# Patient Record
Sex: FEMALE | Race: WHITE | NOT HISPANIC OR LATINO | Employment: FULL TIME | URBAN - METROPOLITAN AREA
[De-identification: names, ages, dates, MRNs, and addresses within clinical notes are randomized per-mention and may not be internally consistent; named-entity substitution may affect disease eponyms.]

---

## 2018-03-01 ENCOUNTER — DOCUMENTATION (OUTPATIENT)
Dept: BARIATRICS/WEIGHT MGMT | Facility: CLINIC | Age: 43
End: 2018-03-01

## 2018-03-01 RX ORDER — SERTRALINE HYDROCHLORIDE 100 MG/1
100 TABLET, FILM COATED ORAL DAILY
COMMUNITY

## 2018-03-01 RX ORDER — LEVOTHYROXINE SODIUM 0.1 MG/1
100 TABLET ORAL DAILY
COMMUNITY

## 2018-03-01 RX ORDER — CLONAZEPAM 0.5 MG/1
0.5 TABLET ORAL 2 TIMES DAILY PRN
COMMUNITY

## 2018-03-28 ENCOUNTER — OFFICE VISIT (OUTPATIENT)
Dept: BARIATRICS/WEIGHT MGMT | Facility: CLINIC | Age: 43
End: 2018-03-28

## 2018-03-28 VITALS
BODY MASS INDEX: 45.73 KG/M2 | RESPIRATION RATE: 18 BRPM | WEIGHT: 248.5 LBS | TEMPERATURE: 97.8 F | SYSTOLIC BLOOD PRESSURE: 133 MMHG | OXYGEN SATURATION: 99 % | HEART RATE: 80 BPM | HEIGHT: 62 IN | DIASTOLIC BLOOD PRESSURE: 93 MMHG

## 2018-03-28 DIAGNOSIS — K21.9 GASTROESOPHAGEAL REFLUX DISEASE, ESOPHAGITIS PRESENCE NOT SPECIFIED: ICD-10-CM

## 2018-03-28 DIAGNOSIS — M54.9 BACK PAIN, UNSPECIFIED BACK LOCATION, UNSPECIFIED BACK PAIN LATERALITY, UNSPECIFIED CHRONICITY: ICD-10-CM

## 2018-03-28 DIAGNOSIS — M19.90 OSTEOARTHRITIS, UNSPECIFIED OSTEOARTHRITIS TYPE, UNSPECIFIED SITE: ICD-10-CM

## 2018-03-28 DIAGNOSIS — R06.09 DYSPNEA ON EXERTION: ICD-10-CM

## 2018-03-28 DIAGNOSIS — R53.83 FATIGUE, UNSPECIFIED TYPE: Primary | ICD-10-CM

## 2018-03-28 DIAGNOSIS — E66.01 OBESITY, CLASS III, BMI 40-49.9 (MORBID OBESITY) (HCC): ICD-10-CM

## 2018-03-28 PROCEDURE — 99204 OFFICE O/P NEW MOD 45 MIN: CPT | Performed by: SURGERY

## 2018-03-28 RX ORDER — RANITIDINE 150 MG/1
150 TABLET ORAL 2 TIMES DAILY
COMMUNITY
End: 2018-05-14 | Stop reason: ALTCHOICE

## 2018-03-28 RX ORDER — SODIUM CHLORIDE 9 MG/ML
150 INJECTION, SOLUTION INTRAVENOUS CONTINUOUS
Status: CANCELLED | OUTPATIENT
Start: 2018-03-28

## 2018-03-28 NOTE — PROGRESS NOTES
[facility]  [address]  [facility] [address]  [phone]      Patient  Name:  Tania Delacruz  :  1975      Date of Visit: 3/28/2018      Chief Complaint:  weight gain; unable to maintain weight loss    History of Present Illness:  Tania Delacruz is a 43 y.o. female who presents today for evaluation, education and consultation regarding weight loss surgery. The patient is interested in sleeve gastrectomy.     Tania has been overweight for at least 35 years, has been 35 pounds or more overweight for at least 35 years, has been 100 pounds or more overweight for 25 or more years and started dieting at age 18.  The patient describes their eating habits as skips meals, unhealthy choices.      Previous diet attempts include: High Protein, Low Carbohydrate, Low Fat and Calorie Counting; Weight Watchers; Amphetamines.  The most weight Tania lost was 40 pounds on diet pills but was only able to maintain that weight loss for 2 years.  Her maximum lifetime weight is 250 pounds.    She has several friends who had weight loss surgery here and are doing great.  She watched the online seminar.  Her mother had a gastric bypass at [place] and had to move in with her for 2 months after surgery because she needed a wound packed.  She had to make sure her mother got enough protein/liquid intake.        Past Medical History:   Diagnosis Date   • Anxiety    • Back pain    • Fatigue    • GERD (gastroesophageal reflux disease)     daily, some relief with Zantac.  Took samples of Dexilant for a while, but couldn't tolerate due to lightheadedness/fatigue that it caused.  The PPI did help Reflux.  Was on Prilosec, but stopped working after starting thyroid meds.   • Hypothyroidism    • Osteoarthritis      Past Surgical History:   Procedure Laterality Date   • ANKLE SURGERY     •  SECTION  ,        No Known Allergies    Current Outpatient Prescriptions:   •   clonazePAM (KlonoPIN) 0.5 MG tablet, Take 0.5 mg by mouth 2 (Two) Times a Day As Needed for Anxiety., Disp: , Rfl:   •  levothyroxine (SYNTHROID, LEVOTHROID) 100 MCG tablet, Take 100 mcg by mouth Daily., Disp: , Rfl:   •  raNITIdine (ZANTAC) 150 MG tablet, Take 150 mg by mouth 2 (Two) Times a Day., Disp: , Rfl:   •  sertraline (ZOLOFT) 25 MG tablet, Take 50 mg by mouth Daily., Disp: , Rfl:     Social History     Social History   • Marital status:      Spouse name: N/A   • Number of children: N/A   • Years of education: N/A     Occupational History   •  [employer]     job is standing     Social History Main Topics   • Smoking status: Current Every Day Smoker     Packs/day: 1.00     Years: 15.00     Types: Cigarettes   • Smokeless tobacco: Never Used      Comment: Is not around secondhand smoke in house or car.     • Alcohol use No   • Drug use: No   • Sexual activity: Not on file     Other Topics Concern   • Not on file     Social History Narrative    Lives with  and child.       Family History   Problem Relation Age of Onset   • Diabetes Mother    • Hypertension Mother    • Sleep apnea Mother    • Obesity Sister    • Hypertension Sister    • Obesity Brother        Review of Systems:  Constitutional:  The patient reports fatigue, weight gain and denies fevers and chills.  Cardiovascular:  The patient reports none and denies HTN, HLD, CP, MI, heart disease and DVT.  Respiratory:  The patient reports none and denies asthma, apnea and PE.  Gastrointestinal:  The patient reports heartburn and denies pancreatitis, liver disease and IBS.  Genitourinary:  The patient denies renal insufficiency.    Musculoskeletal:  The patient reports joint pain, back pain and denies fibromyalgia and autoimmune disease.  Neurological:  The patient reports none and denies seizure and stroke.  Psychiatric:  The patient reports anxiety and denies depression and bipolar disorder.  Endocrine:  The patient reports  thyroid disease and denies diabetes and gout.  Hematologic:  The patient reports none and denies anemia and bleeding disorder.  Skin:  The patient denies MRSA.    Physical Exam:  Vital Signs:  Weight: 113 kg (248 lb 8 oz)   Body mass index is 45.45 kg/m².  Temp: 97.8 °F (36.6 °C)   Heart Rate: 80   BP: 133/93     Physical Exam   Constitutional: She is oriented to person, place, and time. She appears well-developed and well-nourished. No distress.   HENT:   Head: Normocephalic and atraumatic.   Mouth/Throat: No oropharyngeal exudate.   Eyes: Conjunctivae and EOM are normal. Pupils are equal, round, and reactive to light.   Neck: Normal range of motion. Neck supple. No tracheal deviation present. No thyromegaly present.   Cardiovascular: Normal rate, regular rhythm and normal heart sounds.    No murmur heard.  Pulmonary/Chest: Effort normal and breath sounds normal. No respiratory distress.   Abdominal: Soft. She exhibits no distension. There is no tenderness.   C section   Musculoskeletal: Normal range of motion. She exhibits no edema or deformity.   Neurological: She is alert and oriented to person, place, and time. No cranial nerve deficit.   Skin: Skin is warm and dry. No rash noted. She is not diaphoretic. No erythema.   Very tan from tanning bed   Psychiatric: She has a normal mood and affect. Her behavior is normal. Judgment and thought content normal.       There is no problem list on file for this patient.      Assessment:    Tania Delacruz is a 43 y.o. year old female with medically complicated obesity pursuing sleeve gastrectomy.    Weight loss surgery is deemed medically necessary given the following obesity related comorbidities including osteoarthritis, back pain and GERD with current Weight: 113 kg (248 lb 8 oz) and Body mass index is 45.45 kg/m²..    She is a good candidate for weight loss surgery pending further evaluation.    Plan:  The consultation plan and program requirements were reviewed with  the patient.  The patient has been advised that a letter of medical support must be obtained from her primary care physician or referring provider. A psychological evaluation will be arranged.  A nutritional evaluation will be performed.  The patient was advised to start a high protein and low carbohydrate diet.  Necessary lifestyle modifications were discussed.  Instructions on how to access INA was given to the patient.  INA is an internet based educational video that explains the surgical procedure chosen and answers basic questions regarding that procedure.     Preoperative testing will include: CBC, CMP, Fasting Lipids, TSH, H.Pylori, Pulmonary Function Testing, Urine Anabasine, CXR, EKG and EGD     Preop clearances required prior to surgery will include Cardiac.      Patient understands that bariatric surgery is not cosmetic surgery but rather a tool to help make a lifelong commitment to lifestyle changes including diet, exercise, behavior modifications, and healthy habits.  The patient has been educated on expected postoperative lifestyle changes, including commitment to high protein diet, vitamin regimen, and exercise program.  They are aware that support groups are encouraged for optimal weight loss results.        I spent 9 minutes discussion smoking cessation and/or avoidance of second-hand smoke.      I spent 45 minutes with the patient and 40 minutes was spent counseling.          Mahogany Andrew MD

## 2018-03-29 ENCOUNTER — DOCUMENTATION (OUTPATIENT)
Dept: BARIATRICS/WEIGHT MGMT | Facility: CLINIC | Age: 43
End: 2018-03-29

## 2018-03-29 NOTE — PROGRESS NOTES
"Weight Loss Surgery  Presurgical Nutrition Assessment     Tania Delacruz  2018  [id number]  [id number]  1975  female    Surgery desired: Sleeve Gastrectomy    Ht 157.5xm (62\"); Wt 113 kg (248.5#); BMI 45.5  Past Medical History:   Diagnosis Date   • Anxiety    • Back pain    • Fatigue    • GERD (gastroesophageal reflux disease)     daily, some relief with Zantac.  Took samples of Dexilant for a while, but couldn't tolerate due to lightheadedness/fatigue that it caused.  The PPI did help Reflux.  Was on Prilosec, but stopped working after starting thyroid meds.   • Hypothyroidism    • Osteoarthritis      Past Surgical History:   Procedure Laterality Date   • ANKLE SURGERY     •  SECTION  ,      No Known Allergies    Current Outpatient Prescriptions:   •  clonazePAM (KlonoPIN) 0.5 MG tablet, Take 0.5 mg by mouth 2 (Two) Times a Day As Needed for Anxiety., Disp: , Rfl:   •  levothyroxine (SYNTHROID, LEVOTHROID) 100 MCG tablet, Take 100 mcg by mouth Daily., Disp: , Rfl:   •  raNITIdine (ZANTAC) 150 MG tablet, Take 150 mg by mouth 2 (Two) Times a Day., Disp: , Rfl:   •  sertraline (ZOLOFT) 25 MG tablet, Take 50 mg by mouth Daily., Disp: , Rfl:       Nutrition Assessment    Estimated energy needs:  1740  kcal    Estimated calories for weight loss:  1200 kcal    IBW (Pounds):  135 #        Excess body weight (Pounds):  110 #       Nutrition Recall  24 Hour recall: (B) (L) (D) -  Reviewed and discussed with patient.  Drinks 1-2 Pepsi & 1-10oz c.coffee /c cream & sugar each day.  Sometimes additional 12 oz coffee, as stated, for pm snk; Brkfast of 2 scrambled eggs & 3 slices kirby; lunch of 1-30z sausage /c cheddar smoked chips; dinner @ [facility] - chicken meal /c fries & hush puppies.  Also drinks water throughout day, milk at times.       Exercise  never      Education    Provided information packet re:  Sleeve Gastrectomy  1. Reviewed guidelines for higher protein, limited carbohydrate diet to " promote weight loss.  Encouraged patient to incorporate these principles of healthy eating from now until approximately 2 weeks prior to bariatric surgery date, when an even lower carbohydrate “liver-shrinking” regimen will be followed. (Information sheet re pre-op diet given).  Explained that after recovery from surgery this diet will again be followed to ensure further loss and for weight maintenance.    2. Encouraged patient to choose an acceptable protein supplement powder or shake for post-surgery liquid diet.  Provided product guidelines and examples.    3. Explained importance of goal setting to help in changing eating behaviors that are not conducive to weight loss.  Targeted several on a worksheet which also included spaces for patient to work on issues specific to them.  4. Provided follow-up options for support, including contact information for dietitians here, if desired.  Web-based support information and apps for smart phones and computers given.  Noted that monthly support group is offered at this clinic, and that support is associated with successful weight loss.    Recommend that team proceed with surgery and follow per protocol.      Nutrition Goals   Dietary Guidelines per information packet as described above  Protein goal:  grams per day   Carbohydrate goal:  100-140 grams per day  Eliminate soda, sweet tea, etc.     Exercise Goals  Continue current exercise routine   Add 15-30 minutes of activity per day as tolerated      Ginny Soria, SOLEDAD  03/29/2018  12:20 PM

## 2018-04-10 DIAGNOSIS — R06.00 DYSPNEA, UNSPECIFIED TYPE: Primary | ICD-10-CM

## 2018-04-10 DIAGNOSIS — R53.83 FATIGUE, UNSPECIFIED TYPE: ICD-10-CM

## 2018-04-10 DIAGNOSIS — R10.13 DYSPEPSIA: ICD-10-CM

## 2018-04-10 DIAGNOSIS — R06.00 DYSPNEA, UNSPECIFIED TYPE: ICD-10-CM

## 2018-04-30 ENCOUNTER — DOCUMENTATION (OUTPATIENT)
Dept: BARIATRICS/WEIGHT MGMT | Facility: CLINIC | Age: 43
End: 2018-04-30

## 2018-04-30 NOTE — PROGRESS NOTES
[facility]  [address]  [facility] [address]  [phone]        Patient  Name:  Tania Delacruz  :  1975        Date of Visit: 18        Chief Complaint:  weight gain; unable to maintain weight loss     History of Present Illness:  Tania Delacruz is a 43 y.o. female who presents today for evaluation, education and consultation regarding weight loss surgery. The patient is interested in sleeve gastrectomy.       Tania has been overweight for at least 35 years, has been 35 pounds or more overweight for at least 35 years, has been 100 pounds or more overweight for 25 or more years and started dieting at age 18.  The patient describes their eating habits as skips meals, unhealthy choices.       Previous diet attempts include: High Protein, Low Carbohydrate, Low Fat and Calorie Counting; Weight Watchers; Amphetamines.  The most weight Tania lost was 40 pounds on diet pills but was only able to maintain that weight loss for 2 years.  Her maximum lifetime weight is 250 pounds.     She has several friends who had weight loss surgery here and are doing great.  She watched the online seminar.  Her mother had a gastric bypass at [place] and had to move in with her for 2 months after surgery because she needed a wound packed.  She had to make sure her mother got enough protein/liquid intake.          Medical History        Past Medical History:   Diagnosis Date   • Anxiety     • Back pain     • Fatigue     • GERD (gastroesophageal reflux disease)       daily, some relief with Zantac.  Took samples of Dexilant for a while, but couldn't tolerate due to lightheadedness/fatigue that it caused.  The PPI did help Reflux.  Was on Prilosec, but stopped working after starting thyroid meds.   • Hypothyroidism     • Osteoarthritis           Surgical History         Past Surgical History:   Procedure Laterality Date   • ANKLE SURGERY      •  SECTION    1999, 2006            No Known Allergies     Current Outpatient Prescriptions:   •  clonazePAM (KlonoPIN) 0.5 MG tablet, Take 0.5 mg by mouth 2 (Two) Times a Day As Needed for Anxiety., Disp: , Rfl:   •  levothyroxine (SYNTHROID, LEVOTHROID) 100 MCG tablet, Take 100 mcg by mouth Daily., Disp: , Rfl:   •  raNITIdine (ZANTAC) 150 MG tablet, Take 150 mg by mouth 2 (Two) Times a Day., Disp: , Rfl:   •  sertraline (ZOLOFT) 25 MG tablet, Take 50 mg by mouth Daily., Disp: , Rfl:      Social History   Social History            Social History   • Marital status:        Spouse name: N/A   • Number of children: N/A   • Years of education: N/A            Occupational History   •  [employer]       job is standing             Social History Main Topics   • Smoking status: Current Every Day Smoker       Packs/day: 1.00       Years: 15.00       Types: Cigarettes   • Smokeless tobacco: Never Used         Comment: Is not around secondhand smoke in house or car.     • Alcohol use No   • Drug use: No   • Sexual activity: Not on file           Other Topics Concern   • Not on file          Social History Narrative     Lives with  and child.                 Family History   Problem Relation Age of Onset   • Diabetes Mother     • Hypertension Mother     • Sleep apnea Mother     • Obesity Sister     • Hypertension Sister     • Obesity Brother           Review of Systems:  Constitutional:  The patient reports fatigue, weight gain and denies fevers and chills.  Cardiovascular:  The patient reports none and denies HTN, HLD, CP, MI, heart disease and DVT.  Respiratory:  The patient reports none and denies asthma, apnea and PE.  Gastrointestinal:  The patient reports heartburn and denies pancreatitis, liver disease and IBS.  Genitourinary:  The patient denies renal insufficiency.    Musculoskeletal:  The patient reports joint pain, back pain and denies fibromyalgia and autoimmune disease.  Neurological:  The patient  reports none and denies seizure and stroke.  Psychiatric:  The patient reports anxiety and denies depression and bipolar disorder.  Endocrine:  The patient reports thyroid disease and denies diabetes and gout.  Hematologic:  The patient reports none and denies anemia and bleeding disorder.  Skin:  The patient denies MRSA.     Physical Exam:  Vital Signs:  Weight: 113 kg (248 lb 8 oz)   Body mass index is 45.45 kg/m².  Temp: 97.8 °F (36.6 °C)   Heart Rate: 80   BP: 133/93      Physical Exam   Constitutional: She is oriented to person, place, and time. She appears well-developed and well-nourished. No distress.   HENT:   Head: Normocephalic and atraumatic.   Mouth/Throat: No oropharyngeal exudate.   Eyes: Conjunctivae and EOM are normal. Pupils are equal, round, and reactive to light.   Neck: Normal range of motion. Neck supple. No tracheal deviation present. No thyromegaly present.   Cardiovascular: Normal rate, regular rhythm and normal heart sounds.    No murmur heard.  Pulmonary/Chest: Effort normal and breath sounds normal. No respiratory distress.   Abdominal: Soft. She exhibits no distension. There is no tenderness.   C section   Musculoskeletal: Normal range of motion. She exhibits no edema or deformity.   Neurological: She is alert and oriented to person, place, and time. No cranial nerve deficit.   Skin: Skin is warm and dry. No rash noted. She is not diaphoretic. No erythema.   Very tan from tanning bed   Psychiatric: She has a normal mood and affect. Her behavior is normal. Judgment and thought content normal.         There is no problem list on file for this patient.        Assessment:     Tania Delacruz is a 43 y.o. year old female with medically complicated obesity pursuing sleeve gastrectomy.     Weight loss surgery is deemed medically necessary given the following obesity related comorbidities including osteoarthritis, back pain and GERD with current Weight: 113 kg (248 lb 8 oz) and Body mass index  is 45.45 kg/m²..     She is a good candidate for weight loss surgery pending further evaluation.     Plan:  The consultation plan and program requirements were reviewed with the patient.  The patient has been advised that a letter of medical support must be obtained from her primary care physician or referring provider. A psychological evaluation will be arranged.  A nutritional evaluation will be performed.  The patient was advised to start a high protein and low carbohydrate diet.  Necessary lifestyle modifications were discussed.  Instructions on how to access INA was given to the patient.  INA is an internet based educational video that explains the surgical procedure chosen and answers basic questions regarding that procedure.      Preoperative testing will include: CBC, CMP, Fasting Lipids, TSH, H.Pylori, Pulmonary Function Testing, Urine Anabasine, CXR, EKG and EGD      Preop clearances required prior to surgery will include Cardiac.       Patient understands that bariatric surgery is not cosmetic surgery but rather a tool to help make a lifelong commitment to lifestyle changes including diet, exercise, behavior modifications, and healthy habits.  The patient has been educated on expected postoperative lifestyle changes, including commitment to high protein diet, vitamin regimen, and exercise program.  They are aware that support groups are encouraged for optimal weight loss results.          I spent 9 minutes discussion smoking cessation and/or avoidance of second-hand smoke.       I spent 45 minutes with the patient and 40 minutes was spent counseling.             Mahogany Andrew MD

## 2018-05-07 ENCOUNTER — OUTSIDE FACILITY SERVICE (OUTPATIENT)
Dept: BARIATRICS/WEIGHT MGMT | Facility: CLINIC | Age: 43
End: 2018-05-07

## 2018-05-07 ENCOUNTER — LAB REQUISITION (OUTPATIENT)
Dept: LAB | Facility: HOSPITAL | Age: 43
End: 2018-05-07

## 2018-05-07 DIAGNOSIS — K21.9 GASTRO-ESOPHAGEAL REFLUX DISEASE WITHOUT ESOPHAGITIS: ICD-10-CM

## 2018-05-07 PROCEDURE — 43239 EGD BIOPSY SINGLE/MULTIPLE: CPT | Performed by: SURGERY

## 2018-05-07 PROCEDURE — 88305 TISSUE EXAM BY PATHOLOGIST: CPT | Performed by: SURGERY

## 2018-05-08 LAB
CYTO UR: NORMAL
LAB AP CASE REPORT: NORMAL
LAB AP CLINICAL INFORMATION: NORMAL
Lab: NORMAL
PATH REPORT.FINAL DX SPEC: NORMAL
PATH REPORT.GROSS SPEC: NORMAL

## 2018-05-09 ENCOUNTER — TELEPHONE (OUTPATIENT)
Dept: BARIATRICS/WEIGHT MGMT | Facility: CLINIC | Age: 43
End: 2018-05-09

## 2018-05-09 NOTE — TELEPHONE ENCOUNTER
Pt called in and stated that ever since her EGD Monday she has had a very sore throat, and feels as though her uvula is hanging down farther than it did before surgery. Please advise. Thanks.

## 2018-05-10 NOTE — TELEPHONE ENCOUNTER
Spoke with pt and informed her that she could try chloreseptic spray and if that doesn't work to follow up with pcp. Thanks.

## 2018-05-14 ENCOUNTER — CONSULT (OUTPATIENT)
Dept: CARDIOLOGY | Facility: CLINIC | Age: 43
End: 2018-05-14

## 2018-05-14 VITALS
OXYGEN SATURATION: 96 % | HEIGHT: 62 IN | WEIGHT: 248.2 LBS | HEART RATE: 76 BPM | DIASTOLIC BLOOD PRESSURE: 68 MMHG | BODY MASS INDEX: 45.67 KG/M2 | SYSTOLIC BLOOD PRESSURE: 112 MMHG

## 2018-05-14 DIAGNOSIS — I20.8 ANGINAL EQUIVALENT (HCC): ICD-10-CM

## 2018-05-14 DIAGNOSIS — R06.09 DOE (DYSPNEA ON EXERTION): Primary | ICD-10-CM

## 2018-05-14 DIAGNOSIS — Z01.810 PRE-OPERATIVE CARDIOVASCULAR EXAMINATION: ICD-10-CM

## 2018-05-14 PROBLEM — I20.89 ANGINAL EQUIVALENT: Status: ACTIVE | Noted: 2018-05-14

## 2018-05-14 PROCEDURE — 99203 OFFICE O/P NEW LOW 30 MIN: CPT | Performed by: INTERNAL MEDICINE

## 2018-05-14 PROCEDURE — 93000 ELECTROCARDIOGRAM COMPLETE: CPT | Performed by: INTERNAL MEDICINE

## 2018-05-14 RX ORDER — NICOTINE 21 MG/24HR
PATCH, TRANSDERMAL 24 HOURS TRANSDERMAL
Refills: 0 | COMMUNITY
Start: 2018-05-01 | End: 2018-12-04

## 2018-05-14 RX ORDER — AMOXICILLIN 875 MG/1
875 TABLET, COATED ORAL 2 TIMES DAILY
Refills: 0 | COMMUNITY
Start: 2018-05-10 | End: 2018-06-05

## 2018-05-14 RX ORDER — LISINOPRIL 10 MG/1
10 TABLET ORAL EVERY MORNING
Refills: 0 | COMMUNITY
Start: 2018-04-30 | End: 2022-06-28

## 2018-05-14 RX ORDER — RANITIDINE 300 MG/1
300 TABLET ORAL 2 TIMES DAILY
Refills: 1 | COMMUNITY
Start: 2018-04-30 | End: 2018-12-04

## 2018-05-14 NOTE — PROGRESS NOTES
Subjective:     Encounter Date:05/14/2018      Patient ID: Tania Delacruz is a 43 y.o. female.    Chief Complaint:Shortness of breath with activity  HPI  This is a 43-year-old female patient who presents to cardiology clinic for preoperative cardiovascular evaluation prior to bariatric surgery.  The patient reports having shortness of breath with physical activity.  This has been present off and on for the last few months.  It generally occurs whenever she exerts herself and is relieved with rest.  It typically occurs if she is walking fast or lifting heavy objects.  It is worse if she is carrying objects upstairs.  The patient has no history of myocardial infarction or coronary revascularization.  Her shortness of breath is relieved with rest within 5-10 minutes.  There is no orthopnea PND or lower extremity edema.  She has no chest discomfort at rest or with activity.  There is no exertional chest arm neck jaw shoulder or back discomfort.  She has a history of hypertension but no personal history of diabetes or elevated cholesterol.  She is a daily smoker.  She is attempting to quit.  Her family history is negative for premature coronary disease.  She has never had an ischemic evaluation.  She has no history of palpitations dizziness or syncope.  The following portions of the patient's history were reviewed and updated as appropriate: allergies, current medications, past family history, past medical history, past social history, past surgical history and problem  Review of Systems   Constitution: Negative for chills, diaphoresis, fever, weakness, malaise/fatigue, night sweats, weight gain and weight loss.   HENT: Negative for ear discharge, hearing loss, hoarse voice and nosebleeds.    Eyes: Negative for discharge, double vision, pain and photophobia.   Cardiovascular: Positive for dyspnea on exertion. Negative for chest pain, claudication, cyanosis, irregular heartbeat, leg swelling, near-syncope, orthopnea,  palpitations, paroxysmal nocturnal dyspnea and syncope.   Respiratory: Negative for cough, hemoptysis, shortness of breath, sputum production and wheezing.    Endocrine: Negative for cold intolerance, heat intolerance, polydipsia, polyphagia and polyuria.   Hematologic/Lymphatic: Negative for adenopathy and bleeding problem. Does not bruise/bleed easily.   Skin: Negative for color change, flushing, itching and rash.   Musculoskeletal: Negative for muscle cramps, muscle weakness, myalgias and stiffness.   Gastrointestinal: Negative for abdominal pain, diarrhea, hematemesis, hematochezia, nausea and vomiting.   Genitourinary: Negative for dysuria, frequency and nocturia.   Neurological: Negative for focal weakness, loss of balance, numbness, paresthesias and seizures.   Psychiatric/Behavioral: Negative for altered mental status, hallucinations and suicidal ideas.   Allergic/Immunologic: Negative for HIV exposure, hives and persistent infections.           Current Outpatient Prescriptions:   •  al mag oxide-diphenhydramine-nystatin (MAGIC MOUTHWASH) suspension, As Needed., Disp: , Rfl: 0  •  amoxicillin (AMOXIL) 875 MG tablet, Take 875 mg by mouth 2 (Two) Times a Day., Disp: , Rfl: 0  •  clonazePAM (KlonoPIN) 0.5 MG tablet, Take 0.5 mg by mouth 2 (Two) Times a Day As Needed for Anxiety., Disp: , Rfl:   •  levothyroxine (SYNTHROID, LEVOTHROID) 100 MCG tablet, Take 100 mcg by mouth Daily., Disp: , Rfl:   •  lisinopril (PRINIVIL,ZESTRIL) 10 MG tablet, Take 10 mg by mouth Every Morning., Disp: , Rfl: 0  •  nicotine (NICODERM CQ) 21 MG/24HR patch, , Disp: , Rfl: 0  •  raNITIdine (ZANTAC) 300 MG tablet, Take 300 mg by mouth 2 (Two) Times a Day., Disp: , Rfl: 1  •  sertraline (ZOLOFT) 100 MG tablet, Take 100 mg by mouth Daily., Disp: , Rfl:      Objective:     Physical Exam   Constitutional: She is oriented to person, place, and time. She appears well-developed and well-nourished.   HENT:   Head: Normocephalic and atraumatic.  "  Mouth/Throat: Oropharynx is clear and moist.   Eyes: Conjunctivae and EOM are normal. Pupils are equal, round, and reactive to light. No scleral icterus.   Neck: Normal range of motion. Neck supple. No JVD present. No tracheal deviation present. No thyromegaly present.   Cardiovascular: Normal rate, regular rhythm, S1 normal, S2 normal, normal heart sounds, intact distal pulses and normal pulses.  PMI is not displaced.  Exam reveals no gallop and no friction rub.    No murmur heard.  Pulmonary/Chest: Effort normal and breath sounds normal. No respiratory distress. She has no wheezes. She has no rales.   Abdominal: Soft. Bowel sounds are normal. She exhibits no distension and no mass. There is no tenderness. There is no rebound and no guarding.   Musculoskeletal: Normal range of motion. She exhibits no edema or deformity.   Neurological: She is alert and oriented to person, place, and time. She displays normal reflexes. No cranial nerve deficit. Coordination normal.   Skin: Skin is warm and dry. No rash noted. No erythema.   Psychiatric: She has a normal mood and affect. Her behavior is normal. Thought content normal.     Blood pressure 112/68, pulse 76, height 157.5 cm (62.01\"), weight 113 kg (248 lb 3.2 oz), SpO2 96 %.   Lab Review:       Assessment:         1. BERNARDO (dyspnea on exertion)  This is multifactorial in etiology.  Some is certainly related to the patient's obesity and aerobic deconditioning.  There is an element of tobacco-related lung disease with ongoing tobacco abuse.  There may be an element of unrecognized congestive heart failure.  This could also represent an angina equivalent.  - Stress Test With Pet Myocardial Perfusion (Multi Study)  - Adult Transthoracic Echo Complete W/ Cont if Necessary Per Protocol    2. Anginal equivalent  Shortness of breath with activity that is relieved with rest is consistent with the diagnosis of angina pectoris.  The patient has multiple risk factors for coronary " artery disease.  She has never had an ischemic evaluation.  - Stress Test With Pet Myocardial Perfusion (Multi Study)  - Adult Transthoracic Echo Complete W/ Cont if Necessary Per Protocol    3. Pre-operative cardiovascular examination  I have recommended outpatient preoperative testing prior to making a formal statement regarding her operative cardiovascular risk.  - Stress Test With Pet Myocardial Perfusion (Multi Study)  - Adult Transthoracic Echo Complete W/ Cont if Necessary Per Protocol      ECG 12 Lead  Date/Time: 5/14/2018 2:30 PM  Performed by: EDWIN ARMAS  Authorized by: EDWIN ARMAS   Rhythm: sinus rhythm  Rate: normal  QRS axis: normal  Clinical impression: normal ECG             Plan:       I have recommended a vasodilator positron emission tomography myocardial perfusion scan as well as an echocardiogram prior to elective surgery.  The patient has been counseled regarding the essential need to discontinue cigarette smoking.  No changes in her medication therapy have been made at today's visit.  Further recommendations will be predicated on the results of her outpatient testing.  A formal statement regarding her operative cardiovascular risk will be made after the results of her testing is available.

## 2018-05-15 LAB
BH CV ECHO MEAS - % IVS THICK: 36 %
BH CV ECHO MEAS - % LVPW THICK: 41.2 %
BH CV ECHO MEAS - AO MAX PG (FULL): 2.9 MMHG
BH CV ECHO MEAS - AO MAX PG: 6 MMHG
BH CV ECHO MEAS - AO MEAN PG (FULL): 1 MMHG
BH CV ECHO MEAS - AO MEAN PG: 3 MMHG
BH CV ECHO MEAS - AO ROOT AREA: 7.5 CM^2
BH CV ECHO MEAS - AO ROOT DIAM: 3.1 CM
BH CV ECHO MEAS - AO V2 MAX: 119 CM/SEC
BH CV ECHO MEAS - AO V2 MEAN: 82.4 CM/SEC
BH CV ECHO MEAS - AO V2 VTI: 22.3 CM
BH CV ECHO MEAS - AVA(I,A): 2.8 CM^2
BH CV ECHO MEAS - AVA(I,D): 2.8 CM^2
BH CV ECHO MEAS - AVA(V,A): 2.3 CM^2
BH CV ECHO MEAS - AVA(V,D): 2.3 CM^2
BH CV ECHO MEAS - EDV(CUBED): 128.8 ML
BH CV ECHO MEAS - EDV(TEICH): 121 ML
BH CV ECHO MEAS - EF(CUBED): 66.7 %
BH CV ECHO MEAS - EF(TEICH): 58 %
BH CV ECHO MEAS - ESV(CUBED): 42.9 ML
BH CV ECHO MEAS - ESV(TEICH): 50.9 ML
BH CV ECHO MEAS - FS: 30.7 %
BH CV ECHO MEAS - IVS/LVPW: 1.5
BH CV ECHO MEAS - IVSD: 0.9 CM
BH CV ECHO MEAS - IVSS: 1.7 CM
BH CV ECHO MEAS - LA DIMENSION: 3 CM
BH CV ECHO MEAS - LA/AO: 0.97
BH CV ECHO MEAS - LV IVRT: 0.12 SEC
BH CV ECHO MEAS - LV MASS(C)D: 197.6 GRAMS
BH CV ECHO MEAS - LV MASS(C)S: 183 GRAMS
BH CV ECHO MEAS - LV MAX PG: 3.1 MMHG
BH CV ECHO MEAS - LV MEAN PG: 2 MMHG
BH CV ECHO MEAS - LV V1 MAX: 88.7 CM/SEC
BH CV ECHO MEAS - LV V1 MEAN: 58.9 CM/SEC
BH CV ECHO MEAS - LV V1 VTI: 19.6 CM
BH CV ECHO MEAS - LVIDD: 5.1 CM
BH CV ECHO MEAS - LVIDS: 3.5 CM
BH CV ECHO MEAS - LVOT AREA (M): 3.1 CM^2
BH CV ECHO MEAS - LVOT AREA: 3.1 CM^2
BH CV ECHO MEAS - LVOT DIAM: 2 CM
BH CV ECHO MEAS - LVPWD: 0.85 CM
BH CV ECHO MEAS - LVPWS: 1.2 CM
BH CV ECHO MEAS - MV A MAX VEL: 58.4 CM/SEC
BH CV ECHO MEAS - MV DEC SLOPE: 460.5 CM/SEC^2
BH CV ECHO MEAS - MV DEC TIME: 0.16 SEC
BH CV ECHO MEAS - MV E MAX VEL: 70.8 CM/SEC
BH CV ECHO MEAS - MV E/A: 1.2
BH CV ECHO MEAS - MV P1/2T MAX VEL: 77.7 CM/SEC
BH CV ECHO MEAS - MV P1/2T: 49.4 MSEC
BH CV ECHO MEAS - MVA P1/2T LCG: 2.8 CM^2
BH CV ECHO MEAS - MVA(P1/2T): 4.5 CM^2
BH CV ECHO MEAS - PA MAX PG: 2.7 MMHG
BH CV ECHO MEAS - PA V2 MAX: 82.5 CM/SEC
BH CV ECHO MEAS - RAP SYSTOLE: 10 MMHG
BH CV ECHO MEAS - RVSP: 24 MMHG
BH CV ECHO MEAS - SV(AO): 168.3 ML
BH CV ECHO MEAS - SV(CUBED): 85.9 ML
BH CV ECHO MEAS - SV(LVOT): 61.6 ML
BH CV ECHO MEAS - SV(TEICH): 70.1 ML
BH CV ECHO MEAS - TR MAX VEL: 187 CM/SEC
BH CV ECHO MEAS - TV MAX PG: 0.61 MMHG
BH CV ECHO MEAS - TV V2 MAX: 39.2 CM/SEC
LV EF 2D ECHO EST: 60 %

## 2018-05-21 ENCOUNTER — HOSPITAL ENCOUNTER (OUTPATIENT)
Dept: CARDIOLOGY | Facility: HOSPITAL | Age: 43
Discharge: HOME | End: 2018-05-21
Attending: INTERNAL MEDICINE | Admitting: INTERNAL MEDICINE

## 2018-05-21 VITALS
SYSTOLIC BLOOD PRESSURE: 125 MMHG | HEART RATE: 60 BPM | DIASTOLIC BLOOD PRESSURE: 69 MMHG | WEIGHT: 246.91 LBS | HEIGHT: 62 IN | BODY MASS INDEX: 45.44 KG/M2

## 2018-05-21 LAB
BH CV NUCLEAR PRIOR STUDY: 3
BH CV STRESS BP STAGE 1: NORMAL
BH CV STRESS BP STAGE 3: NORMAL
BH CV STRESS BP STAGE 4: NORMAL
BH CV STRESS COMMENTS STAGE 1: NORMAL
BH CV STRESS DOSE REGADENOSON STAGE 1: 0.4
BH CV STRESS DURATION MIN STAGE 1: 1
BH CV STRESS DURATION MIN STAGE 2: 1
BH CV STRESS DURATION MIN STAGE 3: 1
BH CV STRESS DURATION MIN STAGE 4: 1
BH CV STRESS DURATION SEC STAGE 2: 0
BH CV STRESS HR STAGE 1: 117
BH CV STRESS HR STAGE 2: 106
BH CV STRESS HR STAGE 3: 100
BH CV STRESS HR STAGE 4: 93
BH CV STRESS O2 STAGE 1: 98
BH CV STRESS O2 STAGE 2: 100
BH CV STRESS O2 STAGE 3: 100
BH CV STRESS PROTOCOL 1: NORMAL
BH CV STRESS RECOVERY BP: NORMAL MMHG
BH CV STRESS RECOVERY HR: 86 BPM
BH CV STRESS STAGE 1: 1
BH CV STRESS STAGE 2: 2
BH CV STRESS STAGE 3: 3
BH CV STRESS STAGE 4: 4
LV EF NUC BP: 57 %
MAXIMAL PREDICTED HEART RATE: 177 BPM
PERCENT MAX PREDICTED HR: 66.1 %
STRESS BASELINE BP: NORMAL MMHG
STRESS BASELINE HR: 60 BPM
STRESS O2 SAT REST: 94 %
STRESS PERCENT HR: 78 %
STRESS POST EXERCISE DUR MIN: 4 MIN
STRESS POST EXERCISE DUR SEC: 0 SEC
STRESS POST PEAK BP: NORMAL MMHG
STRESS POST PEAK HR: 117 BPM
STRESS TARGET HR: 150 BPM

## 2018-05-21 PROCEDURE — A9555 RB82 RUBIDIUM: HCPCS | Performed by: INTERNAL MEDICINE

## 2018-05-21 PROCEDURE — 93017 CV STRESS TEST TRACING ONLY: CPT

## 2018-05-21 PROCEDURE — 78492 MYOCRD IMG PET MLT RST&STRS: CPT

## 2018-05-21 PROCEDURE — 78492 MYOCRD IMG PET MLT RST&STRS: CPT | Performed by: INTERNAL MEDICINE

## 2018-05-21 PROCEDURE — 25010000002 REGADENOSON 0.4 MG/5ML SOLUTION: Performed by: INTERNAL MEDICINE

## 2018-05-21 PROCEDURE — 0 RUBIDIUM CHLORIDE: Performed by: INTERNAL MEDICINE

## 2018-05-21 PROCEDURE — 93018 CV STRESS TEST I&R ONLY: CPT | Performed by: INTERNAL MEDICINE

## 2018-05-21 RX ADMIN — RUBIDIUM CHLORIDE RB-82 1 DOSE: 150 INJECTION, SOLUTION INTRAVENOUS at 09:40

## 2018-05-21 RX ADMIN — RUBIDIUM CHLORIDE RB-82 1 DOSE: 150 INJECTION, SOLUTION INTRAVENOUS at 09:30

## 2018-05-21 RX ADMIN — REGADENOSON 0.4 MG: 0.08 INJECTION, SOLUTION INTRAVENOUS at 09:36

## 2018-05-22 ENCOUNTER — TELEPHONE (OUTPATIENT)
Dept: CARDIOLOGY | Facility: CLINIC | Age: 43
End: 2018-05-22

## 2018-05-22 NOTE — TELEPHONE ENCOUNTER
----- Message from Tania Delacruz sent at 5/21/2018  9:14 PM EDT -----  Regarding: Non-Urgent Medical Question  Contact: [phone]  I have a question about STRESS TEST WITH PET MYOCARDIAL PERFUSION (MULTI STUDY) resulted on 5/21/18 at 1:44 PM.  Does this appear notmal?

## 2018-06-05 ENCOUNTER — OFFICE VISIT (OUTPATIENT)
Dept: CARDIOLOGY | Facility: CLINIC | Age: 43
End: 2018-06-05

## 2018-06-05 VITALS
WEIGHT: 243.2 LBS | HEIGHT: 62 IN | OXYGEN SATURATION: 98 % | BODY MASS INDEX: 44.75 KG/M2 | HEART RATE: 84 BPM | SYSTOLIC BLOOD PRESSURE: 124 MMHG | DIASTOLIC BLOOD PRESSURE: 62 MMHG | RESPIRATION RATE: 16 BRPM

## 2018-06-05 DIAGNOSIS — R94.39 ABNORMAL NUCLEAR STRESS TEST: ICD-10-CM

## 2018-06-05 DIAGNOSIS — R06.09 DOE (DYSPNEA ON EXERTION): Primary | ICD-10-CM

## 2018-06-05 DIAGNOSIS — I20.8 ANGINAL EQUIVALENT (HCC): ICD-10-CM

## 2018-06-05 DIAGNOSIS — Z01.810 PRE-OPERATIVE CARDIOVASCULAR EXAMINATION: ICD-10-CM

## 2018-06-05 PROCEDURE — 99213 OFFICE O/P EST LOW 20 MIN: CPT | Performed by: INTERNAL MEDICINE

## 2018-06-05 NOTE — PROGRESS NOTES
Subjective:     Encounter Date:06/05/2018      Patient ID: Tania Delacruz is a 43 y.o. female.    Chief Complaint:Shortness of breath with activity  HPI  This is a 43-year-old female patient who recently underwent outpatient testing to evaluate dyspnea with activity as a possible angina equivalent prior to elective bariatric surgery.  The patient underwent an echocardiogram which was normal. The echocardiogram showed no evidence of ventricular hypertrophy or cardiac chamber enlargement.  Left ventricular systolic and diastolic function was normal.  There was no evidence of valvular pathology of significance, pericardial disease, pulmonary hypertension or intracardiac shunting.  The left ventricular ejection fraction was normal and there were no regional wall motion abnormalities.  The patient also underwent a vasodilator positron emission tomography myocardial perfusion imaging stress test.  Unfortunately the cardiologist interpreting that study gave 3 conflict being statements.  The first statement suggested that the patient was demonstrating lateral wall ischemia.  The second statement indicated that the patient had a prior lateral wall myocardial infarction with evaristo-infarction ischemia and a third statement indicated that the study was normal with evidence of attenuation artifact.  The calculated ejection fraction was normal.  The patient continues to have shortness of breath with moderate physical activity.  She has no exertional chest arm neck jaw shoulder or back discomfort.  There is no orthopnea PND or lower extremity edema.  There is no dizziness palpitations or syncope.    The following portions of the patient's history were reviewed and updated as appropriate: allergies, current medications, past family history, past medical history, past social history, past surgical history and problem  Review of Systems   Constitution: Negative for chills, diaphoresis, fever, weakness, malaise/fatigue, weight  gain and weight loss.   HENT: Negative for ear discharge, hearing loss, hoarse voice and nosebleeds.    Eyes: Negative for discharge, double vision, pain and photophobia.   Cardiovascular: Positive for dyspnea on exertion. Negative for chest pain, claudication, cyanosis, irregular heartbeat, leg swelling, near-syncope, orthopnea, palpitations, paroxysmal nocturnal dyspnea and syncope.   Respiratory: Positive for shortness of breath. Negative for cough, hemoptysis, sputum production and wheezing.    Endocrine: Negative for cold intolerance, heat intolerance, polydipsia, polyphagia and polyuria.   Hematologic/Lymphatic: Negative for adenopathy and bleeding problem. Does not bruise/bleed easily.   Skin: Negative for color change, flushing, itching and rash.   Musculoskeletal: Negative for muscle cramps, muscle weakness, myalgias and stiffness.   Gastrointestinal: Negative for abdominal pain, diarrhea, hematemesis, hematochezia, nausea and vomiting.   Genitourinary: Negative for dysuria, frequency and nocturia.   Neurological: Negative for focal weakness, loss of balance, numbness, paresthesias and seizures.   Psychiatric/Behavioral: Negative for altered mental status, hallucinations and suicidal ideas.   Allergic/Immunologic: Negative for HIV exposure, hives and persistent infections.           Current Outpatient Prescriptions:   •  clonazePAM (KlonoPIN) 0.5 MG tablet, Take 0.5 mg by mouth 2 (Two) Times a Day As Needed for Anxiety., Disp: , Rfl:   •  levothyroxine (SYNTHROID, LEVOTHROID) 100 MCG tablet, Take 100 mcg by mouth Daily., Disp: , Rfl:   •  lisinopril (PRINIVIL,ZESTRIL) 10 MG tablet, Take 10 mg by mouth Every Morning., Disp: , Rfl: 0  •  nicotine (NICODERM CQ) 21 MG/24HR patch, , Disp: , Rfl: 0  •  raNITIdine (ZANTAC) 300 MG tablet, Take 300 mg by mouth 2 (Two) Times a Day., Disp: , Rfl: 1  •  sertraline (ZOLOFT) 100 MG tablet, Take 100 mg by mouth Daily., Disp: , Rfl:      Objective:     Physical Exam  "  Constitutional: She is oriented to person, place, and time. She appears well-developed and well-nourished.   HENT:   Head: Normocephalic and atraumatic.   Mouth/Throat: Oropharynx is clear and moist.   Eyes: Conjunctivae and EOM are normal. Pupils are equal, round, and reactive to light. No scleral icterus.   Neck: Normal range of motion. Neck supple. No JVD present. No tracheal deviation present. No thyromegaly present.   Cardiovascular: Normal rate, regular rhythm, S1 normal, S2 normal, normal heart sounds, intact distal pulses and normal pulses.  PMI is not displaced.  Exam reveals no gallop and no friction rub.    No murmur heard.  Pulmonary/Chest: Effort normal and breath sounds normal. No respiratory distress. She has no wheezes. She has no rales.   Abdominal: Soft. Bowel sounds are normal. She exhibits no distension and no mass. There is no tenderness. There is no rebound and no guarding.   Musculoskeletal: Normal range of motion. She exhibits no edema or deformity.   Neurological: She is alert and oriented to person, place, and time. She displays normal reflexes. No cranial nerve deficit. Coordination normal.   Skin: Skin is warm and dry. No rash noted. No erythema.   Psychiatric: She has a normal mood and affect. Her behavior is normal. Thought content normal.     Blood pressure 124/62, pulse 84, resp. rate 16, height 157.5 cm (62.01\"), weight 110 kg (243 lb 3.2 oz), SpO2 98 %.   Lab Review:       Assessment:         1. BERNARDO (dyspnea on exertion)  This is multifactorial in etiology.  Some is certainly related to the patient's obesity and aerobic deconditioning.  There is an element of tobacco-related lung disease with former tobacco abuse.  This could also represent an angina equivalent.    2. Anginal equivalent  Unfortunately given the inconsistencies in the interpretation of her nuclear stress test we cannot make a decision regarding her operative risk.    3. Pre-operative cardiovascular examination  I " "would hold off on elective surgery until her cardiac evaluation can be completed.    Procedures     Plan:       I have discussed with the patient the option of undergoing a coronary angiogram with interventional standby.  As an alternative we have discussed the option of coronary CT angiogram.  The relative \"pros and cons\" of each approach have been discussed in detail.  The patient prefers the coronary CT angiogram.  Further recommendations will be made after the results of this testing is available.  A formal statement regarding her operative risk will be made in light of those test results.         "

## 2018-06-26 ENCOUNTER — TELEPHONE (OUTPATIENT)
Dept: CARDIOLOGY | Facility: CLINIC | Age: 43
End: 2018-06-26

## 2018-06-26 NOTE — TELEPHONE ENCOUNTER
----- Message from Tania Delacruz sent at 6/26/2018  7:25 AM EDT -----  Regarding: Non-Urgent Medical Question  Contact: [phone]  I'm am a nervous wreck about my test I'm having done in [place]...is there anyway you can explain to me the procedure please?

## 2018-06-27 ENCOUNTER — HOSPITAL ENCOUNTER (OUTPATIENT)
Dept: CT IMAGING | Facility: HOSPITAL | Age: 43
Discharge: HOME | End: 2018-06-27
Attending: INTERNAL MEDICINE | Admitting: INTERNAL MEDICINE

## 2018-06-27 VITALS
BODY MASS INDEX: 44.79 KG/M2 | SYSTOLIC BLOOD PRESSURE: 109 MMHG | WEIGHT: 243.4 LBS | HEIGHT: 62 IN | RESPIRATION RATE: 18 BRPM | HEART RATE: 76 BPM | TEMPERATURE: 98.5 F | OXYGEN SATURATION: 98 % | DIASTOLIC BLOOD PRESSURE: 67 MMHG

## 2018-06-27 PROCEDURE — 82565 ASSAY OF CREATININE: CPT

## 2018-06-27 PROCEDURE — 75572 CT HRT W/3D IMAGE: CPT

## 2018-06-27 PROCEDURE — 0 IOPAMIDOL PER 1 ML: Performed by: INTERNAL MEDICINE

## 2018-06-27 RX ORDER — METOPROLOL TARTRATE 50 MG/1
50 TABLET, FILM COATED ORAL ONCE AS NEEDED
Status: COMPLETED | OUTPATIENT
Start: 2018-06-27 | End: 2018-06-27

## 2018-06-27 RX ORDER — NITROGLYCERIN 0.4 MG/1
0.4 TABLET SUBLINGUAL
Status: COMPLETED | OUTPATIENT
Start: 2018-06-27 | End: 2018-06-27

## 2018-06-27 RX ORDER — SODIUM CHLORIDE 0.9 % (FLUSH) 0.9 %
1-10 SYRINGE (ML) INJECTION AS NEEDED
Status: DISCONTINUED | OUTPATIENT
Start: 2018-06-27 | End: 2018-06-28 | Stop reason: HOSPADM

## 2018-06-27 RX ORDER — LIDOCAINE HYDROCHLORIDE 10 MG/ML
5 INJECTION, SOLUTION EPIDURAL; INFILTRATION; INTRACAUDAL; PERINEURAL AS NEEDED
Status: DISCONTINUED | OUTPATIENT
Start: 2018-06-27 | End: 2018-06-28 | Stop reason: HOSPADM

## 2018-06-27 RX ORDER — METOPROLOL TARTRATE 50 MG/1
100 TABLET, FILM COATED ORAL ONCE AS NEEDED
Status: COMPLETED | OUTPATIENT
Start: 2018-06-27 | End: 2018-06-27

## 2018-06-27 RX ADMIN — SODIUM CHLORIDE 500 ML: 9 INJECTION, SOLUTION INTRAVENOUS at 13:45

## 2018-06-27 RX ADMIN — IOPAMIDOL 65 ML: 755 INJECTION, SOLUTION INTRAVENOUS at 13:25

## 2018-06-27 RX ADMIN — NITROGLYCERIN 0.4 MG: 0.4 TABLET SUBLINGUAL at 13:01

## 2018-06-27 RX ADMIN — IOPAMIDOL 150 ML: 755 INJECTION, SOLUTION INTRAVENOUS at 13:15

## 2018-06-27 RX ADMIN — METOPROLOL TARTRATE 50 MG: 50 TABLET ORAL at 11:10

## 2018-06-27 NOTE — NURSING NOTE
Pt discharged from IR dept s/p CT Cardiac 3D morph.  Pt tolerated procedure without complications.  Pt received 500 ml Saline bolus following scan due to receiving additional iv contrast during test.  Encouraged patient to make sure to drink additional po fluids for the next 2 to 3 days to flush contrast through kidneys.  Discharge instructions reviewed with patient and family.  All verbalized understanding.  Pt left unit ambulatory with family.

## 2018-06-28 ENCOUNTER — TELEPHONE (OUTPATIENT)
Dept: INFUSION THERAPY | Facility: HOSPITAL | Age: 43
End: 2018-06-28

## 2018-07-02 ENCOUNTER — OFFICE VISIT (OUTPATIENT)
Dept: CARDIOLOGY | Facility: CLINIC | Age: 43
End: 2018-07-02

## 2018-07-02 VITALS
HEART RATE: 90 BPM | HEIGHT: 62 IN | DIASTOLIC BLOOD PRESSURE: 72 MMHG | OXYGEN SATURATION: 98 % | BODY MASS INDEX: 44.72 KG/M2 | RESPIRATION RATE: 18 BRPM | WEIGHT: 243 LBS | SYSTOLIC BLOOD PRESSURE: 122 MMHG

## 2018-07-02 DIAGNOSIS — R07.2 PRECORDIAL PAIN: Primary | ICD-10-CM

## 2018-07-02 DIAGNOSIS — Z01.810 PRE-OPERATIVE CARDIOVASCULAR EXAMINATION: ICD-10-CM

## 2018-07-02 DIAGNOSIS — R06.02 SOB (SHORTNESS OF BREATH): ICD-10-CM

## 2018-07-02 LAB — CREAT BLDA-MCNC: 0.7 MG/DL (ref 0.6–1.3)

## 2018-07-02 PROCEDURE — 99213 OFFICE O/P EST LOW 20 MIN: CPT | Performed by: INTERNAL MEDICINE

## 2018-07-02 RX ORDER — PANTOPRAZOLE SODIUM 40 MG/1
40 TABLET, DELAYED RELEASE ORAL DAILY
Refills: 1 | COMMUNITY
Start: 2018-06-27 | End: 2019-05-31 | Stop reason: SDUPTHER

## 2018-07-02 NOTE — PROGRESS NOTES
Subjective:     Encounter Date:07/02/2018      Patient ID: Tania Delacruz is a 43 y.o. female.    Chief Complaint:Preoperative cardiovascular evaluation  HPI  This is a 43-year-old female patient who underwent a battery of outpatient testing to evaluate chest discomfort and shortness of breath prior to elective bariatric surgery.  The patient had a vasodilator positron emission tomography examination at [facility] which was negative for inducible ischemia.  The calculated ejection fraction was normal.  There was concern that the patient may have an apical thinning artifact versus a prior MI with evaristo-infarction ischemia.  The presence of preserved systolic thickening on gated analysis would argue for an artifact.  In addition the patient's echocardiogram showed no regional wall motion abnormalities in the apex which would effectively exclude apical scar formation. The echocardiogram showed no evidence of ventricular hypertrophy or cardiac chamber enlargement.  Left ventricular systolic and diastolic function was normal.  There was no evidence of valvular pathology of significance, pericardial disease, pulmonary hypertension or intracardiac shunting.  The left ventricular ejection fraction was normal and there were no regional wall motion abnormalities.    The following portions of the patient's history were reviewed and updated as appropriate: allergies, current medications, past family history, past medical history, past social history, past surgical history and problem  Review of Systems   Constitution: Negative for chills, diaphoresis, fever, weakness, malaise/fatigue, weight gain and weight loss.   HENT: Negative for ear discharge, hearing loss, hoarse voice and nosebleeds.    Eyes: Negative for discharge, double vision, pain and photophobia.   Cardiovascular: Positive for chest pain and dyspnea on exertion. Negative for claudication, cyanosis, irregular heartbeat, leg swelling, near-syncope,  orthopnea, palpitations, paroxysmal nocturnal dyspnea and syncope.   Respiratory: Positive for cough and shortness of breath. Negative for hemoptysis, sputum production and wheezing.    Endocrine: Negative for cold intolerance, heat intolerance, polydipsia, polyphagia and polyuria.   Hematologic/Lymphatic: Negative for adenopathy and bleeding problem. Does not bruise/bleed easily.   Skin: Negative for color change, flushing, itching and rash.   Musculoskeletal: Negative for muscle cramps, muscle weakness, myalgias and stiffness.   Gastrointestinal: Negative for abdominal pain, diarrhea, hematemesis, hematochezia, nausea and vomiting.   Genitourinary: Negative for dysuria, frequency and nocturia.   Neurological: Negative for focal weakness, loss of balance, numbness, paresthesias and seizures.   Psychiatric/Behavioral: Negative for altered mental status, hallucinations and suicidal ideas.   Allergic/Immunologic: Negative for HIV exposure, hives and persistent infections.           Current Outpatient Prescriptions:   •  clonazePAM (KlonoPIN) 0.5 MG tablet, Take 0.5 mg by mouth 2 (Two) Times a Day As Needed for Anxiety., Disp: , Rfl:   •  levothyroxine (SYNTHROID, LEVOTHROID) 100 MCG tablet, Take 100 mcg by mouth Daily., Disp: , Rfl:   •  lisinopril (PRINIVIL,ZESTRIL) 10 MG tablet, Take 10 mg by mouth Every Morning., Disp: , Rfl: 0  •  nicotine (NICODERM CQ) 21 MG/24HR patch, , Disp: , Rfl: 0  •  pantoprazole (PROTONIX) 40 MG EC tablet, Take 40 mg by mouth Daily., Disp: , Rfl: 1  •  raNITIdine (ZANTAC) 300 MG tablet, Take 300 mg by mouth 2 (Two) Times a Day., Disp: , Rfl: 1  •  sertraline (ZOLOFT) 100 MG tablet, Take 100 mg by mouth Daily., Disp: , Rfl:      Objective:     Physical Exam   Constitutional: She is oriented to person, place, and time. She appears well-developed and well-nourished.   HENT:   Head: Normocephalic and atraumatic.   Mouth/Throat: Oropharynx is clear and moist.   Eyes: Conjunctivae and EOM are  "normal. Pupils are equal, round, and reactive to light. No scleral icterus.   Neck: Normal range of motion. Neck supple. No JVD present. No tracheal deviation present. No thyromegaly present.   Cardiovascular: Normal rate, regular rhythm, S1 normal, S2 normal, normal heart sounds, intact distal pulses and normal pulses.  PMI is not displaced.  Exam reveals no gallop and no friction rub.    No murmur heard.  Pulmonary/Chest: Effort normal and breath sounds normal. No respiratory distress. She has no wheezes. She has no rales.   Abdominal: Soft. Bowel sounds are normal. She exhibits no distension and no mass. There is no tenderness. There is no rebound and no guarding.   Musculoskeletal: Normal range of motion. She exhibits no edema or deformity.   Neurological: She is alert and oriented to person, place, and time. She displays normal reflexes. No cranial nerve deficit. Coordination normal.   Skin: Skin is warm and dry. No rash noted. No erythema.   Psychiatric: She has a normal mood and affect. Her behavior is normal. Thought content normal.     Blood pressure 122/72, pulse 90, resp. rate 18, height 157.5 cm (62.01\"), weight 110 kg (243 lb), SpO2 98 %.   Lab Review:       Assessment:         1. Precordial pain  Noncardiac chest pain.  No evidence of an ischemic etiology.    2. Pre-operative cardiovascular examination  From my standpoint the patient is cleared at low risk of a cardiovascular complication from her planned bariatric surgery.    3. SOB (shortness of breath)  No evidence of a cardiac etiology to her shortness of breath.  This most likely represents a manifestation of morbid obesity and aerobic deconditioning.  The patient is also trying to quit.  I suspect she has an element of underlying tobacco-related lung disease.    Procedures     Plan:       No changes in her medication therapy are indicated at this time.  No additional cardiovascular testing is warranted.         "

## 2018-07-16 DIAGNOSIS — Z72.0 TOBACCO USE: Primary | ICD-10-CM

## 2018-08-16 ENCOUNTER — LAB (OUTPATIENT)
Dept: LAB | Facility: HOSPITAL | Age: 43
End: 2018-08-16
Attending: SURGERY

## 2018-08-16 DIAGNOSIS — Z72.0 TOBACCO USE: ICD-10-CM

## 2018-08-16 PROCEDURE — 36415 COLL VENOUS BLD VENIPUNCTURE: CPT

## 2018-08-16 PROCEDURE — G0480 DRUG TEST DEF 1-7 CLASSES: HCPCS

## 2018-08-22 LAB
COTININE UR-MCNC: NORMAL NG/ML
NICOTINE SERPL-MCNC: NORMAL NG/ML

## 2018-11-21 DIAGNOSIS — G47.30 SLEEP APNEA, UNSPECIFIED TYPE: ICD-10-CM

## 2018-11-21 DIAGNOSIS — R53.83 FATIGUE, UNSPECIFIED TYPE: Primary | ICD-10-CM

## 2018-11-26 ENCOUNTER — RESULTS ENCOUNTER (OUTPATIENT)
Dept: BARIATRICS/WEIGHT MGMT | Facility: CLINIC | Age: 43
End: 2018-11-26

## 2018-11-26 DIAGNOSIS — R53.83 FATIGUE, UNSPECIFIED TYPE: ICD-10-CM

## 2018-11-26 DIAGNOSIS — G47.30 SLEEP APNEA, UNSPECIFIED TYPE: ICD-10-CM

## 2018-11-29 ENCOUNTER — APPOINTMENT (OUTPATIENT)
Dept: LAB | Facility: HOSPITAL | Age: 43
End: 2018-11-29

## 2018-11-29 ENCOUNTER — HOSPITAL ENCOUNTER (OUTPATIENT)
Dept: GENERAL RADIOLOGY | Facility: HOSPITAL | Age: 43
Discharge: HOME | End: 2018-11-29

## 2018-11-29 ENCOUNTER — HOSPITAL ENCOUNTER (OUTPATIENT)
Dept: CARDIOLOGY | Facility: HOSPITAL | Age: 43
Discharge: HOME | End: 2018-11-29
Admitting: PHYSICIAN ASSISTANT

## 2018-11-29 DIAGNOSIS — R53.83 FATIGUE, UNSPECIFIED TYPE: ICD-10-CM

## 2018-11-29 DIAGNOSIS — G47.30 SLEEP APNEA, UNSPECIFIED TYPE: ICD-10-CM

## 2018-11-29 LAB
ALBUMIN SERPL-MCNC: 4.4 G/DL (ref 3.5–5)
ALBUMIN/GLOB SERPL: 1.6 G/DL (ref 1–2)
ALP SERPL-CCNC: 71 U/L (ref 38–126)
ALT SERPL W P-5'-P-CCNC: 28 U/L (ref 13–69)
ANION GAP SERPL CALCULATED.3IONS-SCNC: 9.9 MMOL/L (ref 10–20)
AST SERPL-CCNC: 18 U/L (ref 15–46)
BILIRUB SERPL-MCNC: 0.3 MG/DL (ref 0.2–1.3)
BUN BLD-MCNC: 14 MG/DL (ref 7–20)
BUN/CREAT SERPL: 23.3 (ref 7.1–23.5)
CALCIUM SPEC-SCNC: 9.3 MG/DL (ref 8.4–10.2)
CHLORIDE SERPL-SCNC: 102 MMOL/L (ref 98–107)
CO2 SERPL-SCNC: 28 MMOL/L (ref 26–30)
CREAT BLD-MCNC: 0.6 MG/DL (ref 0.6–1.3)
DEPRECATED RDW RBC AUTO: 45.3 FL (ref 37–54)
ERYTHROCYTE [DISTWIDTH] IN BLOOD BY AUTOMATED COUNT: 14.2 % (ref 11.5–14.5)
GFR SERPL CREATININE-BSD FRML MDRD: 109 ML/MIN/1.73
GLOBULIN UR ELPH-MCNC: 2.7 GM/DL
GLUCOSE BLD-MCNC: 107 MG/DL (ref 74–98)
HCT VFR BLD AUTO: 41.2 % (ref 37–47)
HGB BLD-MCNC: 12.8 G/DL (ref 12–16)
MCH RBC QN AUTO: 27.5 PG (ref 27–31)
MCHC RBC AUTO-ENTMCNC: 31.1 G/DL (ref 30–37)
MCV RBC AUTO: 88.4 FL (ref 81–99)
PLATELET # BLD AUTO: 206 10*3/MM3 (ref 130–400)
PMV BLD AUTO: 11.5 FL (ref 6–12)
POTASSIUM BLD-SCNC: 3.9 MMOL/L (ref 3.5–5.1)
PROT SERPL-MCNC: 7.1 G/DL (ref 6.3–8.2)
RBC # BLD AUTO: 4.66 10*6/MM3 (ref 4.2–5.4)
SODIUM BLD-SCNC: 136 MMOL/L (ref 137–145)
WBC NRBC COR # BLD: 10.03 10*3/MM3 (ref 4.8–10.8)

## 2018-11-29 PROCEDURE — 80053 COMPREHEN METABOLIC PANEL: CPT | Performed by: PHYSICIAN ASSISTANT

## 2018-11-29 PROCEDURE — 36415 COLL VENOUS BLD VENIPUNCTURE: CPT | Performed by: PHYSICIAN ASSISTANT

## 2018-11-29 PROCEDURE — 85027 COMPLETE CBC AUTOMATED: CPT | Performed by: PHYSICIAN ASSISTANT

## 2018-11-29 PROCEDURE — 71046 X-RAY EXAM CHEST 2 VIEWS: CPT

## 2018-11-29 PROCEDURE — 93005 ELECTROCARDIOGRAM TRACING: CPT | Performed by: PHYSICIAN ASSISTANT

## 2018-12-04 ENCOUNTER — CONSULT (OUTPATIENT)
Dept: BARIATRICS/WEIGHT MGMT | Facility: CLINIC | Age: 43
End: 2018-12-04

## 2018-12-04 VITALS
HEART RATE: 78 BPM | HEIGHT: 62 IN | DIASTOLIC BLOOD PRESSURE: 76 MMHG | BODY MASS INDEX: 48.58 KG/M2 | SYSTOLIC BLOOD PRESSURE: 126 MMHG | OXYGEN SATURATION: 99 % | WEIGHT: 264 LBS | RESPIRATION RATE: 18 BRPM | TEMPERATURE: 97.9 F

## 2018-12-04 DIAGNOSIS — K21.9 HIATAL HERNIA WITH GASTROESOPHAGEAL REFLUX: ICD-10-CM

## 2018-12-04 DIAGNOSIS — K44.9 HIATAL HERNIA WITH GASTROESOPHAGEAL REFLUX: ICD-10-CM

## 2018-12-04 DIAGNOSIS — E66.01 OBESITY, CLASS III, BMI 40-49.9 (MORBID OBESITY) (HCC): Primary | ICD-10-CM

## 2018-12-04 PROCEDURE — 99214 OFFICE O/P EST MOD 30 MIN: CPT | Performed by: SURGERY

## 2018-12-04 RX ORDER — SODIUM CHLORIDE 0.9 % (FLUSH) 0.9 %
3 SYRINGE (ML) INJECTION EVERY 12 HOURS SCHEDULED
Status: CANCELLED | OUTPATIENT
Start: 2018-12-04

## 2018-12-04 RX ORDER — SODIUM CHLORIDE, SODIUM LACTATE, POTASSIUM CHLORIDE, CALCIUM CHLORIDE 600; 310; 30; 20 MG/100ML; MG/100ML; MG/100ML; MG/100ML
100 INJECTION, SOLUTION INTRAVENOUS CONTINUOUS
Status: CANCELLED | OUTPATIENT
Start: 2018-12-04

## 2018-12-04 RX ORDER — PANTOPRAZOLE SODIUM 40 MG/10ML
40 INJECTION, POWDER, LYOPHILIZED, FOR SOLUTION INTRAVENOUS ONCE
Status: CANCELLED | OUTPATIENT
Start: 2018-12-04

## 2018-12-04 RX ORDER — ACETAMINOPHEN 325 MG/1
650 TABLET ORAL ONCE
Status: CANCELLED | OUTPATIENT
Start: 2018-12-04 | End: 2018-12-04

## 2018-12-04 RX ORDER — CHLORHEXIDINE GLUCONATE 0.12 MG/ML
15 RINSE ORAL ONCE
Status: CANCELLED | OUTPATIENT
Start: 2018-12-04

## 2018-12-04 RX ORDER — SODIUM CHLORIDE 0.9 % (FLUSH) 0.9 %
3-10 SYRINGE (ML) INJECTION AS NEEDED
Status: CANCELLED | OUTPATIENT
Start: 2018-12-04

## 2018-12-04 RX ORDER — SCOLOPAMINE TRANSDERMAL SYSTEM 1 MG/1
1 PATCH, EXTENDED RELEASE TRANSDERMAL CONTINUOUS
Status: CANCELLED | OUTPATIENT
Start: 2018-12-04 | End: 2018-12-07

## 2018-12-10 PROBLEM — K44.9 HIATAL HERNIA WITH GASTROESOPHAGEAL REFLUX: Status: ACTIVE | Noted: 2018-12-10

## 2018-12-10 PROBLEM — E66.813 OBESITY, CLASS III, BMI 40-49.9 (MORBID OBESITY): Status: ACTIVE | Noted: 2018-12-10

## 2018-12-10 PROBLEM — E66.01 OBESITY, CLASS III, BMI 40-49.9 (MORBID OBESITY) (HCC): Status: ACTIVE | Noted: 2018-12-10

## 2018-12-10 PROBLEM — K21.9 HIATAL HERNIA WITH GASTROESOPHAGEAL REFLUX: Status: ACTIVE | Noted: 2018-12-10

## 2018-12-10 NOTE — PROGRESS NOTES
[facility] GROUP BARIATRIC SURGERY  [address]  [facility] [id number] [phone]      Patient  Name:  Tania Delacruz  :  1975      Date of Visit: 18    Chief Complaint:  weight gain; unable to maintain weight loss. Preop LSG    History of Present Illness:  Tania Delacruz is a 43 y.o. female who presents today for evaluation, education and consultation regarding weight loss surgery. Since last seen 18 she has gained 19 lbs.  The patient returns for final visit prior to LSG.  Original intake evaluation Dr. Andrew 3/18 reviewed.  42 yo MO WF from [place]. Husb w her.  Says knows several who have had LSG - say I did her dentist's and dental asst's LSGs.  Quit smoking 3 wks ago - both well aware LSG prohibitive leak isk if cannot be tob and 2nd hand smoke free at least two wks preop and 6 wks post op, says will comply.  Says she has terrible issues w anxiety/panic attacks.  Had abscess tooth, put on abx, asx now, sched to have it pulled this Friday.  Mother had gastric bypass at [facility] 6 yrs ago, doing well per pt.  The patient has had issues with morbid obesity for years and only temporary success with non-surgical methods of weight loss.  The patient is seeking LSG to help with the morbid obesity related conditions of anxiety, back pain, fatigue, hiatal hernia w GERD, HTN, hypothyroidism, OA, low HDL.  Denies GB sx's.      Past Medical History:   Diagnosis Date   • Anxiety    • Back pain    • Fatigue    • GERD (gastroesophageal reflux disease)     daily, some relief with Zantac.  Took samples of Dexilant for a while, but couldn't tolerate due to lightheadedness/fatigue that it caused.  The PPI did help Reflux.  Was on Prilosec, but stopped working after starting thyroid meds.  EGD GDW  HH, Gr II esophagitis, 34 cm. path antrum neg h. pyl, path DE reflux   • Hypertension    • Hypothyroidism    • Low HDL (under 40)    • Osteoarthritis      Past Surgical History:    Procedure Laterality Date   • ANKLE SURGERY     •  SECTION  ,    • ENDOSCOPY  2018       Allergies   Allergen Reactions   • Celexa  [Citalopram Hydrobromide] Anxiety       Current Outpatient Medications:   •  clonazePAM (KlonoPIN) 0.5 MG tablet, Take 0.5 mg by mouth 2 (Two) Times a Day As Needed for Anxiety., Disp: , Rfl:   •  levothyroxine (SYNTHROID, LEVOTHROID) 100 MCG tablet, Take 100 mcg by mouth Daily., Disp: , Rfl:   •  lisinopril (PRINIVIL,ZESTRIL) 10 MG tablet, Take 10 mg by mouth Every Morning., Disp: , Rfl: 0  •  NON FORMULARY, Antibiotics, Disp: , Rfl:   •  pantoprazole (PROTONIX) 40 MG EC tablet, Take 40 mg by mouth Daily., Disp: , Rfl: 1  •  sertraline (ZOLOFT) 100 MG tablet, Take 100 mg by mouth Daily., Disp: , Rfl:     Social History     Socioeconomic History   • Marital status:      Spouse name: Not on file   • Number of children: Not on file   • Years of education: Not on file   • Highest education level: Not on file   Social Needs   • Financial resource strain: Not on file   • Food insecurity - worry: Not on file   • Food insecurity - inability: Not on file   • Transportation needs - medical: Not on file   • Transportation needs - non-medical: Not on file   Occupational History   • Occupation:      Employer: [employer]     Comment: job is standing   Tobacco Use   • Smoking status: Current Every Day Smoker     Packs/day: 1.00     Years: 17.00     Pack years: 17.00     Types: Cigarettes   • Smokeless tobacco: Never Used   • Tobacco comment: Is not around secondhand smoke in house or car.     Substance and Sexual Activity   • Alcohol use: No   • Drug use: No   • Sexual activity: Not on file   Other Topics Concern   • Not on file   Social History Narrative    Lives with  and child.       Family History   Problem Relation Age of Onset   • Diabetes Mother    • Hypertension Mother    • Sleep apnea Mother    • Hyperlipidemia Mother    • Thyroid  disease Mother    • Obesity Sister    • Hypertension Sister    • Obesity Brother        Review of Systems   Constitutional: Positive for fatigue and unexpected weight gain. Negative for chills, diaphoresis, fever and unexpected weight loss.   HENT: Negative for congestion and facial swelling.    Eyes: Negative for blurred vision, double vision and discharge.   Respiratory: Negative for chest tightness, shortness of breath and stridor.    Cardiovascular: Negative for chest pain, palpitations and leg swelling.   Gastrointestinal: Negative for blood in stool.   Endocrine: Negative for polydipsia.   Genitourinary: Negative for hematuria.   Musculoskeletal: Positive for arthralgias.   Skin: Negative for color change.   Allergic/Immunologic: Negative for immunocompromised state.   Neurological: Negative for confusion.   Psychiatric/Behavioral: Negative for self-injury.       I have reviewed the ROS and confirm that it's accurate today.    Physical Exam:  Vital Signs:  Weight: 120 kg (264 lb)   Body mass index is 48.29 kg/m².  Temp: 97.9 °F (36.6 °C)   Heart Rate: 78   BP: 126/76     Physical Exam   Constitutional: She is oriented to person, place, and time. She appears well-developed and well-nourished.   HENT:   Head: Normocephalic and atraumatic.   Nose: Nose normal.   Eyes: Conjunctivae and EOM are normal. Pupils are equal, round, and reactive to light.   Neck: Normal range of motion. Neck supple. Carotid bruit is not present. No tracheal deviation present. No thyromegaly present.   Cardiovascular: Normal rate, regular rhythm and normal heart sounds.   Pulmonary/Chest: Effort normal and breath sounds normal. No respiratory distress.   Abdominal: Soft. She exhibits no distension. There is no hepatosplenomegaly. There is no tenderness.   Lower midline   Musculoskeletal: Normal range of motion. She exhibits no edema or deformity.   Neurological: She is alert and oriented to person, place, and time. No cranial nerve  deficit. Coordination normal.   Skin: Skin is warm and dry. No rash noted.   Psychiatric: She has a normal mood and affect. Her behavior is normal. Judgment and thought content normal.   Vitals reviewed.      Patient Active Problem List   Diagnosis   • SOB (shortness of breath)   • Anginal equivalent (CMS/HCC)   • Pre-operative cardiovascular examination   • Abnormal nuclear stress test   • Precordial pain   Psych Brown 3/18 approp  Mane - clonazepam #60/mos    Assessment:    Tania Delacruz is a 43 y.o. year old female with medically complicated obesity.    Weight loss surgery is deemed medically necessary given the following obesity related comorbidities including anxiety, back pain, fatigue, hiatal hernia w GERD, HTN, hypothyroidism, OA, low HDL with current Weight: 120 kg (264 lb) and Body mass index is 48.29 kg/m²..    Encounter Diagnoses   Name Primary?   • Obesity, Class III, BMI 40-49.9 (morbid obesity) (CMS/HCC) Yes   • Hiatal hernia with gastroesophageal reflux       Patient is aware that surgery is a tool, and that weight loss is not guaranteed but only seen in the context of appropriate use, follow up and exercise.    The patient was present for an approximately a 2.5 hour discussion of the purpose of weight loss surgery, how WLS is a tool to assist in achieving weight loss goals, the most common complications and how best to avoid them, and the strategies for short and long term weight loss.  Ample opportunity to discuss questions was available both in group and during the time of individual examination.    I reviewed CBC, CMP, EKG, CXR, EGD, HP, PFT's, Psych evaluation and MANE.  Please see scanned records that I have reviewed and signed off on today.  All of this in addition to the patient's unique history and exam has been taken into consideration in determining their appropriate candidacy for weight loss surgery.    Complications  of laparoscopic/possible robotic gastric sleeve were discussed.  "The patient is well aware of the potential complications of surgery that include but not limited to bleeding, infections, deep venous thrombosis, pulmonary embolism, pulmonary complications such as pneumonia, cardiac events, hernias, small bowel obstruction, damage to the spleen or other organs, bowel injury, disfiguring scars, failure to lose weight, need for additional surgery, conversion to an open procedure, and death. Patient is also aware of complications which apply in this particular procedure that can include but are not limited to a \"leak\" at the staple line which in some instances may require conversion to gastric bypass.    The patient is aware if a hiatal hernia is encountered, it likely will be repaired.  R/B/A Rx to hiatal hernia repair were discussed as outlined in our long consent form.  Briefly risks in addition to those for LSG include recurrent hernia, JED, dysphagia, esophageal injury, pneumothorax, injury to the vagus nerves, injury to the thoracic duct, aorta or vena cava.    I discussed avoiding all tobacco products and second hand smoke at least 2 weeks pre-operatively and 6 weeks post-operatively to minimize the risk of sleeve leak.  This included discussing the importance of avoiding even secondhand smoke as the risk of leak is increased.  Examples discussed:  I made it very clear that the patient understands they should avoid even riding in a car where someone has previously smoked in the last 2 weeks, living in a house where someone smokes (even if it's in a separate room/patio/attached garage, etc.) we discussed that they should not have a conversation with a group of people who are smoking even if it's outside.  They can be around wood burning fires and barbecue.  I told them I do not know if marijuana has a same effects but my overall recommendation is to avoid it for 2 weeks prior in 6 weeks after surgery.  They also are aware that nicotine may also increase the risk of leak and I " strongly encouraged him to avoid that as well for 2 weeks prior in 6 weeks after surgery.    Discussed the risks, benefits and alternative therapies at great length as outlined in our extensive consent forms, consent videos, and educational teaching process under the direction of the center's .    A copy of the patient's signed informed consent is on file.    R/B/A Rx discussed to postop anticoagulation incl but not limited to bleeding, drug reaction, venothromboembolic events, etc. and she declined.    Plan:  Laparoscopic sleeve gastrectomy, lap HHR.  Tob issues as above.  Concern w 19 lb wt gain since July, anxiety/panic attacks.        Tylre Dobbins MD

## 2018-12-10 NOTE — H&P (VIEW-ONLY)
[facility] GROUP BARIATRIC SURGERY  [address]  [facility] [id number] [phone]      Patient  Name:  Tania Delacruz  :  1975      Date of Visit: 18    Chief Complaint:  weight gain; unable to maintain weight loss. Preop LSG    History of Present Illness:  Tania Delacruz is a 43 y.o. female who presents today for evaluation, education and consultation regarding weight loss surgery. Since last seen 18 she has gained 19 lbs.  The patient returns for final visit prior to LSG.  Original intake evaluation Dr. Andrew 3/18 reviewed.  44 yo MO WF from [place]. Husb w her.  Says knows several who have had LSG - say I did her dentist's and dental asst's LSGs.  Quit smoking 3 wks ago - both well aware LSG prohibitive leak isk if cannot be tob and 2nd hand smoke free at least two wks preop and 6 wks post op, says will comply.  Says she has terrible issues w anxiety/panic attacks.  Had abscess tooth, put on abx, asx now, sched to have it pulled this Friday.  Mother had gastric bypass at [facility] 6 yrs ago, doing well per pt.  The patient has had issues with morbid obesity for years and only temporary success with non-surgical methods of weight loss.  The patient is seeking LSG to help with the morbid obesity related conditions of anxiety, back pain, fatigue, hiatal hernia w GERD, HTN, hypothyroidism, OA, low HDL.  Denies GB sx's.      Past Medical History:   Diagnosis Date   • Anxiety    • Back pain    • Fatigue    • GERD (gastroesophageal reflux disease)     daily, some relief with Zantac.  Took samples of Dexilant for a while, but couldn't tolerate due to lightheadedness/fatigue that it caused.  The PPI did help Reflux.  Was on Prilosec, but stopped working after starting thyroid meds.  EGD [facility]DW  HH, Gr II esophagitis, 34 cm. path antrum neg h. pyl, path DE reflux   • Hypertension    • Hypothyroidism    • Low HDL (under 40)    • Osteoarthritis      Past Surgical History:    Procedure Laterality Date   • ANKLE SURGERY     •  SECTION  ,    • ENDOSCOPY  2018       Allergies   Allergen Reactions   • Celexa  [Citalopram Hydrobromide] Anxiety       Current Outpatient Medications:   •  clonazePAM (KlonoPIN) 0.5 MG tablet, Take 0.5 mg by mouth 2 (Two) Times a Day As Needed for Anxiety., Disp: , Rfl:   •  levothyroxine (SYNTHROID, LEVOTHROID) 100 MCG tablet, Take 100 mcg by mouth Daily., Disp: , Rfl:   •  lisinopril (PRINIVIL,ZESTRIL) 10 MG tablet, Take 10 mg by mouth Every Morning., Disp: , Rfl: 0  •  NON FORMULARY, Antibiotics, Disp: , Rfl:   •  pantoprazole (PROTONIX) 40 MG EC tablet, Take 40 mg by mouth Daily., Disp: , Rfl: 1  •  sertraline (ZOLOFT) 100 MG tablet, Take 100 mg by mouth Daily., Disp: , Rfl:     Social History     Socioeconomic History   • Marital status:      Spouse name: Not on file   • Number of children: Not on file   • Years of education: Not on file   • Highest education level: Not on file   Social Needs   • Financial resource strain: Not on file   • Food insecurity - worry: Not on file   • Food insecurity - inability: Not on file   • Transportation needs - medical: Not on file   • Transportation needs - non-medical: Not on file   Occupational History   • Occupation:      Employer: [employer]     Comment: job is standing   Tobacco Use   • Smoking status: Current Every Day Smoker     Packs/day: 1.00     Years: 17.00     Pack years: 17.00     Types: Cigarettes   • Smokeless tobacco: Never Used   • Tobacco comment: Is not around secondhand smoke in house or car.     Substance and Sexual Activity   • Alcohol use: No   • Drug use: No   • Sexual activity: Not on file   Other Topics Concern   • Not on file   Social History Narrative    Lives with  and child.       Family History   Problem Relation Age of Onset   • Diabetes Mother    • Hypertension Mother    • Sleep apnea Mother    • Hyperlipidemia Mother    • Thyroid  disease Mother    • Obesity Sister    • Hypertension Sister    • Obesity Brother        Review of Systems   Constitutional: Positive for fatigue and unexpected weight gain. Negative for chills, diaphoresis, fever and unexpected weight loss.   HENT: Negative for congestion and facial swelling.    Eyes: Negative for blurred vision, double vision and discharge.   Respiratory: Negative for chest tightness, shortness of breath and stridor.    Cardiovascular: Negative for chest pain, palpitations and leg swelling.   Gastrointestinal: Negative for blood in stool.   Endocrine: Negative for polydipsia.   Genitourinary: Negative for hematuria.   Musculoskeletal: Positive for arthralgias.   Skin: Negative for color change.   Allergic/Immunologic: Negative for immunocompromised state.   Neurological: Negative for confusion.   Psychiatric/Behavioral: Negative for self-injury.       I have reviewed the ROS and confirm that it's accurate today.    Physical Exam:  Vital Signs:  Weight: 120 kg (264 lb)   Body mass index is 48.29 kg/m².  Temp: 97.9 °F (36.6 °C)   Heart Rate: 78   BP: 126/76     Physical Exam   Constitutional: She is oriented to person, place, and time. She appears well-developed and well-nourished.   HENT:   Head: Normocephalic and atraumatic.   Nose: Nose normal.   Eyes: Conjunctivae and EOM are normal. Pupils are equal, round, and reactive to light.   Neck: Normal range of motion. Neck supple. Carotid bruit is not present. No tracheal deviation present. No thyromegaly present.   Cardiovascular: Normal rate, regular rhythm and normal heart sounds.   Pulmonary/Chest: Effort normal and breath sounds normal. No respiratory distress.   Abdominal: Soft. She exhibits no distension. There is no hepatosplenomegaly. There is no tenderness.   Lower midline   Musculoskeletal: Normal range of motion. She exhibits no edema or deformity.   Neurological: She is alert and oriented to person, place, and time. No cranial nerve  deficit. Coordination normal.   Skin: Skin is warm and dry. No rash noted.   Psychiatric: She has a normal mood and affect. Her behavior is normal. Judgment and thought content normal.   Vitals reviewed.      Patient Active Problem List   Diagnosis   • SOB (shortness of breath)   • Anginal equivalent (CMS/HCC)   • Pre-operative cardiovascular examination   • Abnormal nuclear stress test   • Precordial pain   Psych Brown 3/18 approp  Mane - clonazepam #60/mos    Assessment:    Tania Delacruz is a 43 y.o. year old female with medically complicated obesity.    Weight loss surgery is deemed medically necessary given the following obesity related comorbidities including anxiety, back pain, fatigue, hiatal hernia w GERD, HTN, hypothyroidism, OA, low HDL with current Weight: 120 kg (264 lb) and Body mass index is 48.29 kg/m²..    Encounter Diagnoses   Name Primary?   • Obesity, Class III, BMI 40-49.9 (morbid obesity) (CMS/HCC) Yes   • Hiatal hernia with gastroesophageal reflux       Patient is aware that surgery is a tool, and that weight loss is not guaranteed but only seen in the context of appropriate use, follow up and exercise.    The patient was present for an approximately a 2.5 hour discussion of the purpose of weight loss surgery, how WLS is a tool to assist in achieving weight loss goals, the most common complications and how best to avoid them, and the strategies for short and long term weight loss.  Ample opportunity to discuss questions was available both in group and during the time of individual examination.    I reviewed CBC, CMP, EKG, CXR, EGD, HP, PFT's, Psych evaluation and MANE.  Please see scanned records that I have reviewed and signed off on today.  All of this in addition to the patient's unique history and exam has been taken into consideration in determining their appropriate candidacy for weight loss surgery.    Complications  of laparoscopic/possible robotic gastric sleeve were discussed.  "The patient is well aware of the potential complications of surgery that include but not limited to bleeding, infections, deep venous thrombosis, pulmonary embolism, pulmonary complications such as pneumonia, cardiac events, hernias, small bowel obstruction, damage to the spleen or other organs, bowel injury, disfiguring scars, failure to lose weight, need for additional surgery, conversion to an open procedure, and death. Patient is also aware of complications which apply in this particular procedure that can include but are not limited to a \"leak\" at the staple line which in some instances may require conversion to gastric bypass.    The patient is aware if a hiatal hernia is encountered, it likely will be repaired.  R/B/A Rx to hiatal hernia repair were discussed as outlined in our long consent form.  Briefly risks in addition to those for LSG include recurrent hernia, JED, dysphagia, esophageal injury, pneumothorax, injury to the vagus nerves, injury to the thoracic duct, aorta or vena cava.    I discussed avoiding all tobacco products and second hand smoke at least 2 weeks pre-operatively and 6 weeks post-operatively to minimize the risk of sleeve leak.  This included discussing the importance of avoiding even secondhand smoke as the risk of leak is increased.  Examples discussed:  I made it very clear that the patient understands they should avoid even riding in a car where someone has previously smoked in the last 2 weeks, living in a house where someone smokes (even if it's in a separate room/patio/attached garage, etc.) we discussed that they should not have a conversation with a group of people who are smoking even if it's outside.  They can be around wood burning fires and barbecue.  I told them I do not know if marijuana has a same effects but my overall recommendation is to avoid it for 2 weeks prior in 6 weeks after surgery.  They also are aware that nicotine may also increase the risk of leak and I " strongly encouraged him to avoid that as well for 2 weeks prior in 6 weeks after surgery.    Discussed the risks, benefits and alternative therapies at great length as outlined in our extensive consent forms, consent videos, and educational teaching process under the direction of the center's .    A copy of the patient's signed informed consent is on file.    R/B/A Rx discussed to postop anticoagulation incl but not limited to bleeding, drug reaction, venothromboembolic events, etc. and she declined.    Plan:  Laparoscopic sleeve gastrectomy, lap HHR.  Tob issues as above.  Concern w 19 lb wt gain since July, anxiety/panic attacks.        Tyler Dobbins MD

## 2018-12-16 ENCOUNTER — APPOINTMENT (OUTPATIENT)
Dept: PREADMISSION TESTING | Facility: HOSPITAL | Age: 43
End: 2018-12-16

## 2018-12-16 DIAGNOSIS — Z01.89 LABORATORY TEST: Primary | ICD-10-CM

## 2018-12-16 LAB
ABO GROUP BLD: NORMAL
DEPRECATED RDW RBC AUTO: 44.5 FL (ref 37–54)
ERYTHROCYTE [DISTWIDTH] IN BLOOD BY AUTOMATED COUNT: 14.2 % (ref 11.3–14.5)
HBA1C MFR BLD: 6 % (ref 4.8–5.6)
HCT VFR BLD AUTO: 40.6 % (ref 34.5–44)
HGB BLD-MCNC: 12.9 G/DL (ref 11.5–15.5)
MCH RBC QN AUTO: 27.6 PG (ref 27–31)
MCHC RBC AUTO-ENTMCNC: 31.8 G/DL (ref 32–36)
MCV RBC AUTO: 86.8 FL (ref 80–99)
PLATELET # BLD AUTO: 253 10*3/MM3 (ref 150–450)
PMV BLD AUTO: 11.2 FL (ref 6–12)
RBC # BLD AUTO: 4.68 10*6/MM3 (ref 3.89–5.14)
RH BLD: POSITIVE
WBC NRBC COR # BLD: 7.88 10*3/MM3 (ref 3.5–10.8)

## 2018-12-16 PROCEDURE — 85027 COMPLETE CBC AUTOMATED: CPT | Performed by: ANESTHESIOLOGY

## 2018-12-16 PROCEDURE — 36415 COLL VENOUS BLD VENIPUNCTURE: CPT

## 2018-12-16 PROCEDURE — 86901 BLOOD TYPING SEROLOGIC RH(D): CPT

## 2018-12-16 PROCEDURE — 86900 BLOOD TYPING SEROLOGIC ABO: CPT

## 2018-12-16 PROCEDURE — 83036 HEMOGLOBIN GLYCOSYLATED A1C: CPT | Performed by: ANESTHESIOLOGY

## 2018-12-16 NOTE — DISCHARGE INSTRUCTIONS

## 2018-12-20 ENCOUNTER — ANESTHESIA EVENT (OUTPATIENT)
Dept: PERIOP | Facility: HOSPITAL | Age: 43
End: 2018-12-20

## 2018-12-20 RX ORDER — FAMOTIDINE 10 MG/ML
20 INJECTION, SOLUTION INTRAVENOUS ONCE
Status: CANCELLED | OUTPATIENT
Start: 2018-12-20 | End: 2018-12-20

## 2018-12-20 RX ORDER — FAMOTIDINE 20 MG/1
20 TABLET, FILM COATED ORAL ONCE
Status: CANCELLED | OUTPATIENT
Start: 2018-12-20 | End: 2018-12-20

## 2018-12-21 ENCOUNTER — ANESTHESIA (OUTPATIENT)
Dept: PERIOP | Facility: HOSPITAL | Age: 43
End: 2018-12-21

## 2018-12-21 ENCOUNTER — HOSPITAL ENCOUNTER (INPATIENT)
Facility: HOSPITAL | Age: 43
LOS: 1 days | Discharge: HOME | End: 2018-12-22
Attending: SURGERY | Admitting: SURGERY

## 2018-12-21 DIAGNOSIS — K21.9 HIATAL HERNIA WITH GASTROESOPHAGEAL REFLUX: ICD-10-CM

## 2018-12-21 DIAGNOSIS — E66.01 OBESITY, CLASS III, BMI 40-49.9 (MORBID OBESITY) (HCC): ICD-10-CM

## 2018-12-21 DIAGNOSIS — K44.9 HIATAL HERNIA WITH GASTROESOPHAGEAL REFLUX: ICD-10-CM

## 2018-12-21 LAB
ABO GROUP BLD: NORMAL
B-HCG UR QL: NEGATIVE
BLD GP AB SCN SERPL QL: NEGATIVE
INTERNAL NEGATIVE CONTROL: NORMAL
INTERNAL POSITIVE CONTROL: NORMAL
Lab: NORMAL
POTASSIUM BLDA-SCNC: 4.14 MMOL/L (ref 3.5–5.3)
RH BLD: POSITIVE
T&S EXPIRATION DATE: NORMAL

## 2018-12-21 PROCEDURE — 25010000002 PROMETHAZINE PER 50 MG: Performed by: NURSE ANESTHETIST, CERTIFIED REGISTERED

## 2018-12-21 PROCEDURE — 25010000003 CEFAZOLIN IN DEXTROSE 2-4 GM/100ML-% SOLUTION: Performed by: PHYSICIAN ASSISTANT

## 2018-12-21 PROCEDURE — 84132 ASSAY OF SERUM POTASSIUM: CPT | Performed by: ANESTHESIOLOGY

## 2018-12-21 PROCEDURE — 43775 LAP SLEEVE GASTRECTOMY: CPT | Performed by: SURGERY

## 2018-12-21 PROCEDURE — 0DJ08ZZ INSPECTION OF UPPER INTESTINAL TRACT, VIA NATURAL OR ARTIFICIAL OPENING ENDOSCOPIC: ICD-10-PCS | Performed by: SURGERY

## 2018-12-21 PROCEDURE — 25010000002 DEXAMETHASONE SODIUM PHOSPHATE 10 MG/ML SOLUTION: Performed by: ANESTHESIOLOGY

## 2018-12-21 PROCEDURE — 25010000002 PROMETHAZINE PER 50 MG: Performed by: SURGERY

## 2018-12-21 PROCEDURE — 81025 URINE PREGNANCY TEST: CPT | Performed by: SURGERY

## 2018-12-21 PROCEDURE — 25010000002 PROPOFOL 10 MG/ML EMULSION: Performed by: NURSE ANESTHETIST, CERTIFIED REGISTERED

## 2018-12-21 PROCEDURE — 25010000002 ONDANSETRON PER 1 MG: Performed by: SURGERY

## 2018-12-21 PROCEDURE — 25010000003 CEFAZOLIN IN DEXTROSE 2-4 GM/100ML-% SOLUTION: Performed by: SURGERY

## 2018-12-21 PROCEDURE — 86850 RBC ANTIBODY SCREEN: CPT | Performed by: SURGERY

## 2018-12-21 PROCEDURE — 25010000002 BUPRENORPHINE PER 0.1 MG: Performed by: NURSE ANESTHETIST, CERTIFIED REGISTERED

## 2018-12-21 PROCEDURE — 94760 N-INVAS EAR/PLS OXIMETRY 1: CPT

## 2018-12-21 PROCEDURE — 94799 UNLISTED PULMONARY SVC/PX: CPT

## 2018-12-21 PROCEDURE — 86900 BLOOD TYPING SEROLOGIC ABO: CPT | Performed by: SURGERY

## 2018-12-21 PROCEDURE — 0BQT4ZZ REPAIR DIAPHRAGM, PERCUTANEOUS ENDOSCOPIC APPROACH: ICD-10-PCS | Performed by: SURGERY

## 2018-12-21 PROCEDURE — 25010000002 ENOXAPARIN PER 10 MG: Performed by: SURGERY

## 2018-12-21 PROCEDURE — 88307 TISSUE EXAM BY PATHOLOGIST: CPT | Performed by: SURGERY

## 2018-12-21 PROCEDURE — 0DB64Z3 EXCISION OF STOMACH, PERCUTANEOUS ENDOSCOPIC APPROACH, VERTICAL: ICD-10-PCS | Performed by: SURGERY

## 2018-12-21 PROCEDURE — 25010000002 ONDANSETRON PER 1 MG: Performed by: NURSE ANESTHETIST, CERTIFIED REGISTERED

## 2018-12-21 PROCEDURE — 86901 BLOOD TYPING SEROLOGIC RH(D): CPT | Performed by: SURGERY

## 2018-12-21 PROCEDURE — 25010000002 NEOSTIGMINE 10 MG/10ML SOLUTION: Performed by: NURSE ANESTHETIST, CERTIFIED REGISTERED

## 2018-12-21 PROCEDURE — 25010000002 HYDROMORPHONE 1 MG/ML SOLUTION: Performed by: SURGERY

## 2018-12-21 DEVICE — BLACK REINFORCED INTELLIGENT RELOAD, FOR USE WITH SIGNIA STAPLING SYSTEM
Type: IMPLANTABLE DEVICE | Site: STOMACH | Status: FUNCTIONAL
Brand: TRI-STAPLE 2.0

## 2018-12-21 DEVICE — REINFORCED INTELLIGENT RELOAD, FOR USE WITH SIGNIA STAPLING SYSTEM
Type: IMPLANTABLE DEVICE | Site: STOMACH | Status: FUNCTIONAL
Brand: TRI-STAPLE 2.0

## 2018-12-21 DEVICE — SEALANT FIBRIN TISSEEL FZ 4ML: Type: IMPLANTABLE DEVICE | Site: STOMACH | Status: FUNCTIONAL

## 2018-12-21 RX ORDER — PROMETHAZINE HYDROCHLORIDE 25 MG/1
25 SUPPOSITORY RECTAL ONCE AS NEEDED
Status: COMPLETED | OUTPATIENT
Start: 2018-12-21 | End: 2018-12-21

## 2018-12-21 RX ORDER — SODIUM CHLORIDE, SODIUM LACTATE, POTASSIUM CHLORIDE, CALCIUM CHLORIDE 600; 310; 30; 20 MG/100ML; MG/100ML; MG/100ML; MG/100ML
9 INJECTION, SOLUTION INTRAVENOUS CONTINUOUS
Status: DISCONTINUED | OUTPATIENT
Start: 2018-12-21 | End: 2018-12-21 | Stop reason: SDUPTHER

## 2018-12-21 RX ORDER — SODIUM CHLORIDE, SODIUM LACTATE, POTASSIUM CHLORIDE, CALCIUM CHLORIDE 600; 310; 30; 20 MG/100ML; MG/100ML; MG/100ML; MG/100ML
150 INJECTION, SOLUTION INTRAVENOUS CONTINUOUS
Status: CANCELLED | OUTPATIENT
Start: 2018-12-21

## 2018-12-21 RX ORDER — SODIUM CHLORIDE 0.9 % (FLUSH) 0.9 %
3-10 SYRINGE (ML) INJECTION AS NEEDED
Status: DISCONTINUED | OUTPATIENT
Start: 2018-12-21 | End: 2018-12-21 | Stop reason: HOSPADM

## 2018-12-21 RX ORDER — CHLORHEXIDINE GLUCONATE 0.12 MG/ML
15 RINSE ORAL ONCE
Status: CANCELLED | OUTPATIENT
Start: 2018-12-21

## 2018-12-21 RX ORDER — PROMETHAZINE HYDROCHLORIDE 25 MG/ML
6.25 INJECTION, SOLUTION INTRAMUSCULAR; INTRAVENOUS ONCE AS NEEDED
Status: COMPLETED | OUTPATIENT
Start: 2018-12-21 | End: 2018-12-21

## 2018-12-21 RX ORDER — MEPERIDINE HYDROCHLORIDE 25 MG/ML
12.5 INJECTION INTRAMUSCULAR; INTRAVENOUS; SUBCUTANEOUS
Status: DISCONTINUED | OUTPATIENT
Start: 2018-12-21 | End: 2018-12-21 | Stop reason: HOSPADM

## 2018-12-21 RX ORDER — GLYCOPYRROLATE 0.2 MG/ML
INJECTION INTRAMUSCULAR; INTRAVENOUS AS NEEDED
Status: DISCONTINUED | OUTPATIENT
Start: 2018-12-21 | End: 2018-12-21 | Stop reason: SURG

## 2018-12-21 RX ORDER — ONDANSETRON 4 MG/1
4 TABLET, FILM COATED ORAL EVERY 6 HOURS PRN
Status: DISCONTINUED | OUTPATIENT
Start: 2018-12-21 | End: 2018-12-22 | Stop reason: HOSPADM

## 2018-12-21 RX ORDER — LEVOTHYROXINE SODIUM 0.1 MG/1
100 TABLET ORAL
Status: DISCONTINUED | OUTPATIENT
Start: 2018-12-22 | End: 2018-12-22 | Stop reason: HOSPADM

## 2018-12-21 RX ORDER — METOCLOPRAMIDE HYDROCHLORIDE 5 MG/ML
10 INJECTION INTRAMUSCULAR; INTRAVENOUS EVERY 6 HOURS PRN
Status: DISCONTINUED | OUTPATIENT
Start: 2018-12-21 | End: 2018-12-22 | Stop reason: HOSPADM

## 2018-12-21 RX ORDER — CEFAZOLIN SODIUM 2 G/100ML
2 INJECTION, SOLUTION INTRAVENOUS ONCE
Status: COMPLETED | OUTPATIENT
Start: 2018-12-21 | End: 2018-12-21

## 2018-12-21 RX ORDER — BUPRENORPHINE HYDROCHLORIDE 0.32 MG/ML
INJECTION INTRAMUSCULAR; INTRAVENOUS AS NEEDED
Status: DISCONTINUED | OUTPATIENT
Start: 2018-12-21 | End: 2018-12-21 | Stop reason: SURG

## 2018-12-21 RX ORDER — CYANOCOBALAMIN 1000 UG/ML
1000 INJECTION, SOLUTION INTRAMUSCULAR; SUBCUTANEOUS ONCE
Status: COMPLETED | OUTPATIENT
Start: 2018-12-22 | End: 2018-12-22

## 2018-12-21 RX ORDER — PANTOPRAZOLE SODIUM 40 MG/10ML
40 INJECTION, POWDER, LYOPHILIZED, FOR SOLUTION INTRAVENOUS ONCE
Status: CANCELLED | OUTPATIENT
Start: 2018-12-21 | End: 2018-12-21

## 2018-12-21 RX ORDER — SODIUM CHLORIDE, SODIUM LACTATE, POTASSIUM CHLORIDE, CALCIUM CHLORIDE 600; 310; 30; 20 MG/100ML; MG/100ML; MG/100ML; MG/100ML
150 INJECTION, SOLUTION INTRAVENOUS CONTINUOUS
Status: DISCONTINUED | OUTPATIENT
Start: 2018-12-21 | End: 2018-12-21 | Stop reason: SDUPTHER

## 2018-12-21 RX ORDER — NEOSTIGMINE METHYLSULFATE 1 MG/ML
INJECTION, SOLUTION INTRAVENOUS AS NEEDED
Status: DISCONTINUED | OUTPATIENT
Start: 2018-12-21 | End: 2018-12-21 | Stop reason: SURG

## 2018-12-21 RX ORDER — DEXAMETHASONE SODIUM PHOSPHATE 10 MG/ML
INJECTION, SOLUTION INTRAMUSCULAR; INTRAVENOUS
Status: COMPLETED | OUTPATIENT
Start: 2018-12-21 | End: 2018-12-21

## 2018-12-21 RX ORDER — SODIUM CHLORIDE AND POTASSIUM CHLORIDE 150; 450 MG/100ML; MG/100ML
125 INJECTION, SOLUTION INTRAVENOUS CONTINUOUS
Status: DISCONTINUED | OUTPATIENT
Start: 2018-12-22 | End: 2018-12-22 | Stop reason: HOSPADM

## 2018-12-21 RX ORDER — HYDROMORPHONE HYDROCHLORIDE 1 MG/ML
0.5 INJECTION, SOLUTION INTRAMUSCULAR; INTRAVENOUS; SUBCUTANEOUS
Status: DISCONTINUED | OUTPATIENT
Start: 2018-12-21 | End: 2018-12-21 | Stop reason: HOSPADM

## 2018-12-21 RX ORDER — CLONAZEPAM 0.5 MG/1
0.5 TABLET ORAL 2 TIMES DAILY PRN
Status: DISCONTINUED | OUTPATIENT
Start: 2018-12-21 | End: 2018-12-22 | Stop reason: HOSPADM

## 2018-12-21 RX ORDER — HYDROMORPHONE HYDROCHLORIDE 2 MG/1
2 TABLET ORAL EVERY 4 HOURS PRN
Status: DISCONTINUED | OUTPATIENT
Start: 2018-12-21 | End: 2018-12-22 | Stop reason: HOSPADM

## 2018-12-21 RX ORDER — DIPHENHYDRAMINE HYDROCHLORIDE 50 MG/ML
25 INJECTION INTRAMUSCULAR; INTRAVENOUS EVERY 4 HOURS PRN
Status: DISCONTINUED | OUTPATIENT
Start: 2018-12-21 | End: 2018-12-22 | Stop reason: HOSPADM

## 2018-12-21 RX ORDER — SCOLOPAMINE TRANSDERMAL SYSTEM 1 MG/1
1 PATCH, EXTENDED RELEASE TRANSDERMAL CONTINUOUS
Status: DISCONTINUED | OUTPATIENT
Start: 2018-12-21 | End: 2018-12-22 | Stop reason: HOSPADM

## 2018-12-21 RX ORDER — LORAZEPAM 1 MG/1
1 TABLET ORAL EVERY 12 HOURS PRN
Status: DISCONTINUED | OUTPATIENT
Start: 2018-12-21 | End: 2018-12-22 | Stop reason: HOSPADM

## 2018-12-21 RX ORDER — HYDROCODONE BITARTRATE AND ACETAMINOPHEN 7.5; 325 MG/1; MG/1
1 TABLET ORAL EVERY 4 HOURS PRN
Status: DISCONTINUED | OUTPATIENT
Start: 2018-12-21 | End: 2018-12-22 | Stop reason: HOSPADM

## 2018-12-21 RX ORDER — BUPIVACAINE HYDROCHLORIDE 2.5 MG/ML
INJECTION, SOLUTION EPIDURAL; INFILTRATION; INTRACAUDAL
Status: COMPLETED | OUTPATIENT
Start: 2018-12-21 | End: 2018-12-21

## 2018-12-21 RX ORDER — PROMETHAZINE HYDROCHLORIDE 25 MG/ML
12.5 INJECTION, SOLUTION INTRAMUSCULAR; INTRAVENOUS EVERY 6 HOURS PRN
Status: DISCONTINUED | OUTPATIENT
Start: 2018-12-21 | End: 2018-12-22 | Stop reason: HOSPADM

## 2018-12-21 RX ORDER — MAGNESIUM HYDROXIDE 1200 MG/15ML
LIQUID ORAL AS NEEDED
Status: DISCONTINUED | OUTPATIENT
Start: 2018-12-21 | End: 2018-12-21 | Stop reason: HOSPADM

## 2018-12-21 RX ORDER — PROMETHAZINE HYDROCHLORIDE 25 MG/1
25 TABLET ORAL ONCE AS NEEDED
Status: COMPLETED | OUTPATIENT
Start: 2018-12-21 | End: 2018-12-21

## 2018-12-21 RX ORDER — ACETAMINOPHEN 325 MG/1
650 TABLET ORAL ONCE
Status: CANCELLED | OUTPATIENT
Start: 2018-12-21 | End: 2018-12-21

## 2018-12-21 RX ORDER — ALBUTEROL SULFATE 2.5 MG/3ML
2.5 SOLUTION RESPIRATORY (INHALATION) EVERY 4 HOURS PRN
Status: DISCONTINUED | OUTPATIENT
Start: 2018-12-21 | End: 2018-12-22 | Stop reason: HOSPADM

## 2018-12-21 RX ORDER — PANTOPRAZOLE SODIUM 40 MG/10ML
40 INJECTION, POWDER, LYOPHILIZED, FOR SOLUTION INTRAVENOUS
Status: DISCONTINUED | OUTPATIENT
Start: 2018-12-22 | End: 2018-12-22 | Stop reason: HOSPADM

## 2018-12-21 RX ORDER — CLONIDINE HYDROCHLORIDE 0.1 MG/1
0.1 TABLET ORAL EVERY 6 HOURS PRN
Status: DISCONTINUED | OUTPATIENT
Start: 2018-12-21 | End: 2018-12-22 | Stop reason: HOSPADM

## 2018-12-21 RX ORDER — LABETALOL HYDROCHLORIDE 5 MG/ML
10 INJECTION, SOLUTION INTRAVENOUS
Status: DISCONTINUED | OUTPATIENT
Start: 2018-12-21 | End: 2018-12-22 | Stop reason: HOSPADM

## 2018-12-21 RX ORDER — ONDANSETRON 2 MG/ML
4 INJECTION INTRAMUSCULAR; INTRAVENOUS EVERY 6 HOURS PRN
Status: DISCONTINUED | OUTPATIENT
Start: 2018-12-21 | End: 2018-12-22 | Stop reason: HOSPADM

## 2018-12-21 RX ORDER — SIMETHICONE 80 MG
80 TABLET,CHEWABLE ORAL 4 TIMES DAILY PRN
Status: DISCONTINUED | OUTPATIENT
Start: 2018-12-21 | End: 2018-12-22 | Stop reason: HOSPADM

## 2018-12-21 RX ORDER — SODIUM CHLORIDE 9 MG/ML
INJECTION, SOLUTION INTRAVENOUS AS NEEDED
Status: DISCONTINUED | OUTPATIENT
Start: 2018-12-21 | End: 2018-12-21 | Stop reason: HOSPADM

## 2018-12-21 RX ORDER — SCOLOPAMINE TRANSDERMAL SYSTEM 1 MG/1
1 PATCH, EXTENDED RELEASE TRANSDERMAL CONTINUOUS
Status: CANCELLED | OUTPATIENT
Start: 2018-12-21 | End: 2018-12-24

## 2018-12-21 RX ORDER — ONDANSETRON 2 MG/ML
INJECTION INTRAMUSCULAR; INTRAVENOUS AS NEEDED
Status: DISCONTINUED | OUTPATIENT
Start: 2018-12-21 | End: 2018-12-21 | Stop reason: SURG

## 2018-12-21 RX ORDER — ROCURONIUM BROMIDE 10 MG/ML
INJECTION, SOLUTION INTRAVENOUS AS NEEDED
Status: DISCONTINUED | OUTPATIENT
Start: 2018-12-21 | End: 2018-12-21 | Stop reason: SURG

## 2018-12-21 RX ORDER — ACETAMINOPHEN 325 MG/1
650 TABLET ORAL ONCE
Status: COMPLETED | OUTPATIENT
Start: 2018-12-21 | End: 2018-12-21

## 2018-12-21 RX ORDER — PROPOFOL 10 MG/ML
VIAL (ML) INTRAVENOUS CONTINUOUS PRN
Status: DISCONTINUED | OUTPATIENT
Start: 2018-12-21 | End: 2018-12-21 | Stop reason: SURG

## 2018-12-21 RX ORDER — FENTANYL CITRATE 50 UG/ML
50 INJECTION, SOLUTION INTRAMUSCULAR; INTRAVENOUS
Status: DISCONTINUED | OUTPATIENT
Start: 2018-12-21 | End: 2018-12-21 | Stop reason: HOSPADM

## 2018-12-21 RX ORDER — LIDOCAINE HYDROCHLORIDE 10 MG/ML
0.5 INJECTION, SOLUTION EPIDURAL; INFILTRATION; INTRACAUDAL; PERINEURAL ONCE AS NEEDED
Status: DISCONTINUED | OUTPATIENT
Start: 2018-12-21 | End: 2018-12-21 | Stop reason: HOSPADM

## 2018-12-21 RX ORDER — ONDANSETRON 2 MG/ML
4 INJECTION INTRAMUSCULAR; INTRAVENOUS ONCE AS NEEDED
Status: DISCONTINUED | OUTPATIENT
Start: 2018-12-21 | End: 2018-12-21 | Stop reason: HOSPADM

## 2018-12-21 RX ORDER — CEFAZOLIN SODIUM 2 G/100ML
2 INJECTION, SOLUTION INTRAVENOUS EVERY 8 HOURS
Status: COMPLETED | OUTPATIENT
Start: 2018-12-21 | End: 2018-12-22

## 2018-12-21 RX ORDER — LISINOPRIL 10 MG/1
10 TABLET ORAL EVERY MORNING
Status: DISCONTINUED | OUTPATIENT
Start: 2018-12-22 | End: 2018-12-22 | Stop reason: HOSPADM

## 2018-12-21 RX ORDER — SODIUM CHLORIDE 0.9 % (FLUSH) 0.9 %
3 SYRINGE (ML) INJECTION EVERY 12 HOURS SCHEDULED
Status: DISCONTINUED | OUTPATIENT
Start: 2018-12-21 | End: 2018-12-21 | Stop reason: HOSPADM

## 2018-12-21 RX ORDER — PANTOPRAZOLE SODIUM 40 MG/10ML
40 INJECTION, POWDER, LYOPHILIZED, FOR SOLUTION INTRAVENOUS ONCE
Status: DISCONTINUED | OUTPATIENT
Start: 2018-12-21 | End: 2018-12-21 | Stop reason: HOSPADM

## 2018-12-21 RX ORDER — CHLORHEXIDINE GLUCONATE 0.12 MG/ML
15 RINSE ORAL ONCE
Status: COMPLETED | OUTPATIENT
Start: 2018-12-21 | End: 2018-12-21

## 2018-12-21 RX ORDER — SODIUM CHLORIDE, SODIUM LACTATE, POTASSIUM CHLORIDE, CALCIUM CHLORIDE 600; 310; 30; 20 MG/100ML; MG/100ML; MG/100ML; MG/100ML
150 INJECTION, SOLUTION INTRAVENOUS CONTINUOUS
Status: ACTIVE | OUTPATIENT
Start: 2018-12-21 | End: 2018-12-22

## 2018-12-21 RX ORDER — SERTRALINE HYDROCHLORIDE 100 MG/1
100 TABLET, FILM COATED ORAL DAILY
Status: DISCONTINUED | OUTPATIENT
Start: 2018-12-21 | End: 2018-12-22 | Stop reason: HOSPADM

## 2018-12-21 RX ORDER — LORAZEPAM 2 MG/ML
0.5 INJECTION INTRAMUSCULAR EVERY 12 HOURS PRN
Status: DISCONTINUED | OUTPATIENT
Start: 2018-12-21 | End: 2018-12-22 | Stop reason: HOSPADM

## 2018-12-21 RX ADMIN — PROMETHAZINE HYDROCHLORIDE 6.25 MG: 25 INJECTION INTRAMUSCULAR; INTRAVENOUS at 17:40

## 2018-12-21 RX ADMIN — TRANEXAMIC ACID 1000 MG: 100 INJECTION, SOLUTION INTRAVENOUS at 16:46

## 2018-12-21 RX ADMIN — SODIUM CHLORIDE, POTASSIUM CHLORIDE, SODIUM LACTATE AND CALCIUM CHLORIDE 150 ML/HR: 600; 310; 30; 20 INJECTION, SOLUTION INTRAVENOUS at 19:54

## 2018-12-21 RX ADMIN — HYDROMORPHONE HYDROCHLORIDE 1 MG: 1 INJECTION, SOLUTION INTRAMUSCULAR; INTRAVENOUS; SUBCUTANEOUS at 20:04

## 2018-12-21 RX ADMIN — CHLORHEXIDINE GLUCONATE 0.12% ORAL RINSE 15 ML: 1.2 LIQUID ORAL at 12:45

## 2018-12-21 RX ADMIN — SODIUM CHLORIDE, POTASSIUM CHLORIDE, SODIUM LACTATE AND CALCIUM CHLORIDE 1000 ML: 600; 310; 30; 20 INJECTION, SOLUTION INTRAVENOUS at 12:45

## 2018-12-21 RX ADMIN — HYDROMORPHONE HYDROCHLORIDE 1 MG: 1 INJECTION, SOLUTION INTRAMUSCULAR; INTRAVENOUS; SUBCUTANEOUS at 23:47

## 2018-12-21 RX ADMIN — PROMETHAZINE HYDROCHLORIDE 12.5 MG: 25 INJECTION INTRAMUSCULAR; INTRAVENOUS at 23:47

## 2018-12-21 RX ADMIN — SODIUM CHLORIDE, POTASSIUM CHLORIDE, SODIUM LACTATE AND CALCIUM CHLORIDE: 600; 310; 30; 20 INJECTION, SOLUTION INTRAVENOUS at 12:35

## 2018-12-21 RX ADMIN — PROPOFOL 25 MCG/KG/MIN: 10 INJECTION, EMULSION INTRAVENOUS at 15:24

## 2018-12-21 RX ADMIN — BUPIVACAINE HYDROCHLORIDE 60 ML: 2.5 INJECTION, SOLUTION EPIDURAL; INFILTRATION; INTRACAUDAL; PERINEURAL at 15:22

## 2018-12-21 RX ADMIN — SODIUM CHLORIDE, POTASSIUM CHLORIDE, SODIUM LACTATE AND CALCIUM CHLORIDE 150 ML/HR: 600; 310; 30; 20 INJECTION, SOLUTION INTRAVENOUS at 13:09

## 2018-12-21 RX ADMIN — SCOPALAMINE 1 PATCH: 1 PATCH, EXTENDED RELEASE TRANSDERMAL at 12:45

## 2018-12-21 RX ADMIN — BUPRENORPHINE HYDROCHLORIDE 300 MCG: 0.32 INJECTION INTRAMUSCULAR; INTRAVENOUS at 15:16

## 2018-12-21 RX ADMIN — ONDANSETRON 4 MG: 2 INJECTION INTRAMUSCULAR; INTRAVENOUS at 17:12

## 2018-12-21 RX ADMIN — SERTRALINE HYDROCHLORIDE 100 MG: 100 TABLET ORAL at 19:53

## 2018-12-21 RX ADMIN — NEOSTIGMINE METHYLSULFATE 2 MG: 1 INJECTION, SOLUTION INTRAVENOUS at 17:10

## 2018-12-21 RX ADMIN — EPHEDRINE SULFATE 10 MG: 50 INJECTION INTRAMUSCULAR; INTRAVENOUS; SUBCUTANEOUS at 15:36

## 2018-12-21 RX ADMIN — ONDANSETRON 4 MG: 2 INJECTION INTRAMUSCULAR; INTRAVENOUS at 21:11

## 2018-12-21 RX ADMIN — CEFAZOLIN SODIUM 2 G: 2 INJECTION, SOLUTION INTRAVENOUS at 23:50

## 2018-12-21 RX ADMIN — ROCURONIUM BROMIDE 10 MG: 10 SOLUTION INTRAVENOUS at 16:11

## 2018-12-21 RX ADMIN — DEXAMETHASONE SODIUM PHOSPHATE 4 MG: 10 INJECTION, SOLUTION INTRAMUSCULAR; INTRAVENOUS at 15:22

## 2018-12-21 RX ADMIN — GLYCOPYRROLATE 0.4 MG: 0.2 INJECTION, SOLUTION INTRAMUSCULAR; INTRAVENOUS at 17:10

## 2018-12-21 RX ADMIN — SODIUM CHLORIDE, POTASSIUM CHLORIDE, SODIUM LACTATE AND CALCIUM CHLORIDE: 600; 310; 30; 20 INJECTION, SOLUTION INTRAVENOUS at 16:12

## 2018-12-21 RX ADMIN — CEFAZOLIN SODIUM 2 G: 2 INJECTION, SOLUTION INTRAVENOUS at 15:03

## 2018-12-21 RX ADMIN — ACETAMINOPHEN 650 MG: 325 TABLET, FILM COATED ORAL at 12:45

## 2018-12-21 NOTE — ANESTHESIA PREPROCEDURE EVALUATION
Anesthesia Evaluation     Patient summary reviewed and Nursing notes reviewed                Airway   Mallampati: III  TM distance: >3 FB  Neck ROM: full  Possible difficult intubation  Dental - normal exam     Pulmonary - normal exam   (+) a smoker (quit 1 month ago) Former,   Cardiovascular - normal exam    (+) hypertension,       Neuro/Psych- negative ROS  GI/Hepatic/Renal/Endo    (+) morbid obesity, GERD,  hypothyroidism,     Musculoskeletal     Abdominal  - normal exam    Bowel sounds: normal.   Substance History - negative use     OB/GYN negative ob/gyn ROS         Other   (+) arthritis                   Anesthesia Plan    ASA 3     general   (TAP    glidescope)  intravenous induction   Anesthetic plan, all risks, benefits, and alternatives have been provided, discussed and informed consent has been obtained with: patient.    Plan discussed with CRNA.

## 2018-12-21 NOTE — ANESTHESIA PROCEDURE NOTES
ANESTHESIA PERIPHERAL BLOCK      Patient location during procedure: OR  Reason for block: at surgeon's request and post-op pain management  Performed by  Anesthesiologist: Zhang Faye MD  Preanesthetic Checklist  Completed: patient identified, site marked, surgical consent, pre-op evaluation, timeout performed, IV checked, risks and benefits discussed and monitors and equipment checked  Prep:  Pt Position: supine  Sterile barriers:cap, gloves, sterile barriers and mask  Prep: ChloraPrep  Patient monitoring: blood pressure monitoring, continuous pulse oximetry and EKG  Procedure  Sedation:yes  Performed under: general  Guidance:ultrasound guided  Images:still images obtained    Laterality:Bilateral  Block Type:TAP  Injection Technique:single-shot  Needle Type:short-bevel and echogenic  Needle Gauge:20 G    Medications Used: dexamethasone sodium phosphate injection, 4 mg  bupivacaine PF (MARCAINE) 0.25 % injection, 60 mL  Medications  Comment:Block Injection:  LA dose divided between Right and Left block       Adjuncts:  Decadron 4mg PSF, Buprenex 0.3mg (Per total volume of LA)    Post Assessment  Injection Assessment: negative aspiration for heme, incremental injection and no paresthesia on injection  Patient Tolerance:comfortable throughout block  Complications:no  Additional Notes      Under Ultrasound guidance, a BBraun 4inch 360 degree needle was advanced with Normal Saline hydro dissection of tissue.  The Internal Oblique and Transversus Abdominus muscles where visualized.  At or before the aponeurosis of Internal Oblique, local anesthetic spread was visualized in the Transversus Abdominus Plane. Injection was made incrementally with aspiration every 5 mls.  There was no  intravascular injection,  injection pressure was normal, there was no neural injection, and the procedure was completed without difficulty.  Thank You.

## 2018-12-21 NOTE — ANESTHESIA PROCEDURE NOTES
ANESTHESIA INTUBATION  Urgency: elective      General Information and Staff    Patient location during procedure: OR  CRNA: Kelton Olson CRNA    Indications and Patient Condition  Indications for airway management: airway protection    Preoxygenated: yes  MILS not maintained throughout  Mask difficulty assessment: 1 - vent by mask    Final Airway Details  Final airway type: endotracheal airway      Successful airway: ETT  Cuffed: yes   Successful intubation technique: direct laryngoscopy  Endotracheal tube insertion site: oral  Blade: Polo  Blade size: 2  ETT size (mm): 7.0  Cormack-Lehane Classification: grade I - full view of glottis  Placement verified by: chest auscultation and capnometry   Cuff volume (mL): 8  Measured from: lips  ETT to lips (cm): 21  Number of attempts at approach: 1

## 2018-12-21 NOTE — BRIEF OP NOTE
GASTRIC SLEEVE LAPAROSCOPIC, HIATAL HERNIA REPAIR LAPAROSCOPIC, ESOPHAGOGASTRODUODENOSCOPY  Progress Note    Tania Delacruz  12/21/2018    Pre-op Diagnosis:   Obesity, Class III, BMI 40-49.9 (morbid obesity) (CMS/[facility]) [E66.01]  Hiatal hernia with gastroesophageal reflux [K21.9, K44.9]       Post-Op Diagnosis Codes:     * Obesity, Class III, BMI 40-49.9 (morbid obesity) (CMS/[facility]) [E66.01]     * Hiatal hernia with gastroesophageal reflux [K21.9, K44.9]    Procedure/CPT® Codes:  OR LAP, HERMES RESTRICT PROC, LONGITUDINAL GASTRECTOMY [66120]  OR LAP,ESOPHAGOGAST FUNDOPLASTY [51355]  OR ESOPHAGOGASTRODUODENOSCOPY TRANSORAL DIAGNOSTIC [30783]    Procedure(s):  GASTRIC SLEEVE LAPAROSCOPIC  HIATAL HERNIA REPAIR LAPAROSCOPIC  ESOPHAGOGASTRODUODENOSCOPY    Surgeon(s):  Tyler Dobbins MD    Anesthesia: General with Block    Staff:   Circulator: Tanvi Gamino RN; Camila Bearden RN; Scout Rios RN  Scrub Person: Mily Kovacs  Nursing Assistant: Ladan Chino; Agustin Pacheco; Armani Zapata    Estimated Blood Loss: 100ml    Urine Voided: * No values recorded between 12/21/2018  3:03 PM and 12/21/2018  5:16 PM *    Specimens:                ID Type Source Tests Collected by Time   A : SUB-TOTAL GASTRECTOMY Tissue Stomach TISSUE PATHOLOGY EXAM Tyler Dobbins MD 12/21/2018 1647         Drains:      Findings:     Complications: none      Tyler Dobbins MD     Date: 12/21/2018  Time: 5:16 PM

## 2018-12-21 NOTE — ANESTHESIA POSTPROCEDURE EVALUATION
Patient: Tania Delacruz    Procedure Summary     Date:  12/21/18 Room / Location:   [facility]  /  [facility]    Anesthesia Start:  1502 Anesthesia Stop:      Procedures:       GASTRIC SLEEVE LAPAROSCOPIC (N/A Abdomen)      HIATAL HERNIA REPAIR LAPAROSCOPIC (N/A Abdomen)      ESOPHAGOGASTRODUODENOSCOPY (N/A Esophagus) Diagnosis:       Obesity, Class III, BMI 40-49.9 (morbid obesity) (CMS/[facility])      Hiatal hernia with gastroesophageal reflux      (Obesity, Class III, BMI 40-49.9 (morbid obesity) (CMS/[facility]) [E66.01])      (Hiatal hernia with gastroesophageal reflux [K21.9, K44.9])    Surgeon:  Tyler Dobbins MD Provider:  Zhang Faye MD    Anesthesia Type:  general ASA Status:  3          Anesthesia Type: general  Last vitals  BP   133/78 (12/21/18 1733)   Temp   97.5 °F (36.4 °C) (12/21/18 1733)   Pulse   87 (12/21/18 1733)   Resp   18 (12/21/18 1220)     SpO2   92 % (12/21/18 1733)     Post Anesthesia Care and Evaluation    Patient location during evaluation: PACU  Patient participation: complete - patient participated  Level of consciousness: awake and alert  Pain score: 2  Pain management: adequate  Airway patency: patent  Anesthetic complications: No anesthetic complications  PONV Status: none  Cardiovascular status: hemodynamically stable and acceptable  Respiratory status: nonlabored ventilation, acceptable and nasal cannula  Hydration status: acceptable

## 2018-12-21 NOTE — OP NOTE
Preoperative Diagnosis:   Morbid Obesity with Multiple Co-Morbidities                                                                         Hiatal hernia with gastroesophageal reflux                                                                                       Disease    Postoperative Diagnosis:   Same    Procedure:                                                      Laparoscopic Sleeve Gastrectomy (85% subtotal vertical gastrectomy) over a 36 Italian Bougie Dilator                                                                        Laparoscopic hiatal hernia repair (not paraesophageal)                                                                        EGD    Surgeon:                                                       DAVID Dobbins MD    Anesthesia:                                                   GETA    EBL:                                                              100 cc    Fluids:                                                           Crystalloid    Specimens:                                                   Subtotal gastrectomy    Drains:                                                           None    Counts:                                                          Correct    Complications:                                               None    Indications:   This is a 43-year-old morbidly obese white female who presents for elective laparoscopic sleeve gastrectomy and concomitant hiatal hernia repair.  She's undergone our extensive preoperative education teaching and consent process everything's in order and she wishes to proceed.    Operative technique:     The patient was brought to the operating room, and placed supine upon the operating room table.  SCD hose were placed, she underwent uneventful general endotracheal anesthesia per the anesthesiology staff, she received IV Ancef and subcutaneous Lovenox, the anesthesiology staff performed a tap block, and her abdomen was  prepped and draped with ChloraPrep in a sterile fashion, an Ioban was used as well, a Myers catheter was not placed.    The peritoneal cavity was entered in the upper abdomen to the left of midline using an 11 mm trocar and an Optiview technique and the abdomen was insufflated to a pressure of 15 mmHg with CO2 gas.  Exploratory laparoscopy revealed no evidence of injury from the entrance technique, a normal appearing liver, adhesions along a thin walled opaque gallbladder, lower midline omental adhesions from the umbilical level and extending into the pelvis.  Remaining trocars were placed under direct visualization including an additional 11 mm trocar in the left mid abdomen, 5 mm trocars in the right upper quadrant and left subcostal position, and after lysing some of the adhesions, a 15 mm trocar was placed to the right of the umbilicus. Through a stab incision in the epigastrium a Woody retractor was used to elevate the left lobe of the liver exposing the hiatus.  There was an obvious small hiatal hernia from the anterior view and this was photodocumented.  Beginning approximately two thirds of the way around the greater curvature the stomach, the gastrocolic vessels were divided with the Ligasure device.  This proceeded proximally taking down all the short gastric vessels and exposing the left el.  A tiny superior pole splenic infarct noted and photodocumented. The hernia sac was incised along the base of the left el and this was extended up and across the phrenoesophageal membrane.  The pars flaccida was divided, preserving a replaced hepatic vessel the hepatic branch of the vagus nerve.  The hernia sac was incised along the base of the right el and this was extended up and across the phrenoesophageal membrane.  The hernia sac and it's contents were dissected out of the mediastinum and reduced below the level of the crura. There was not a paraesophageal component.  The GE junction was then lengthened  to well below the level of the crura by dissecting areolar tissue well into the mediastinum.  A 1/2 inch penrose drain was used temporarily for esophageal retraction.  The anterior and posterior vagus nerves were preserved.  The crura were dissected to their meeting point inferiorly.  The hiatal hernia repair was performed posteriorly with a two interrupted 0 silk suture with good result.  Photodocumentation before and after repair was obtained.  The penrose drain was removed.   Gastrocolic vessels were then divided medially to a few cm proximal to the pylorus.  Some of the filmy attachments of the posterior stomach to the pancreas and retroperitoneum were divided.  The anesthesiology staff passed a 36 Togolese blunt tip bougie dilator which was manipulated along the lesser curvature into the distal antrum.  The 85% subtotal vertical sleeve gastrectomy was then performed over the 36 Togolese bougie dilator using a Pancetera articulating electronic linear stapler with attached dual absorbable strips.  The first firing was a 60 mm black load, the next firing 45 mm purple load, the next 3 firings were 60 mm purple loads, and the final firing was a 45 mm purple load.  After clamping down the final load and prior to firing it the bougie dilator was slowly removed.  The sleeve was performed such that it was uniform in size, no hourglassing or narrowing, especially at the angularis, and the final firing was done a centimeter away from the angle of His to hopefully avoid incorporating esophageal fibers.  The subtotal gastrectomy specimen was placed into a large retrieval bag and withdrawn, it was a larger and thicker than average specimen.   Some excessive oozing noted and also what appeared to be a tiny splenic capsular tear from fat pad retraction in the mid portion of the sleeve near the hilum.  This was treated with some Floseal and a Raytec, minimal blood loss from the area.  1 gm TXA IV given.  The sleeve was submerged  under saline.  Upper endoscopy was performed, and the endoscope was advanced into the duodenal bulb.  No air bubbles or leak seen, no active bleeding at the staple line, no narrowing at the angularis, no pyloric spasm or deformity, no gastritis, no hiatal hernia or Joshi's esophagus, and the endoscope was withdrawn.  The subtotal gastrectomy specimen was inspected, staples were all well formed confirming laparoscopic findings and it was sent unopened to pathology for permanent section.  Irrigation fluid was suctioned free.  The sleeve was resting nicely and hemostatic. The spleen was dry, remaining Floseal placed in this area.  The lateral superior aspect of the sleeve was tacked to the diaphragm adjacent to the left el with a 2-0 vicryl plus suture.  The sleeve was anchored to the omentum with a running 2-0 vicryl plus suture beginning proximally and extending beyond the angularis.  The sleeve staple line was treated with 4 cc of aerosolized Tisseal fibrin glue.   Photodocumentation of the sleeve was obtained. The Woody retractor was removed.  Fascia at the 15 mm trocar site incision was closed with a horizontal mattress 0 Vicryl suture placed with a suture passer under direct visualization and tying the knot extracorporeally.  There was some presumed epigastric bleeding controlled with placement of an additional simple  interrupted 0 vicryl suture placed in a similar fashion.  No rectus hematoma seen.   Remaining trocars were removed under direct visualization, no bleeding noted from their sites.  Subcutaneous tissue in the 15 mm incision was closed with a figure-of-eight 2-0 Vicryl plus suture, and skin in each incision was closed using 3-0 Monocryl plus in an interrupted subcuticular stitch followed by skin-a-fix.  The patient tolerated the procedure well without complication, was taken to the recovery room in stable condition.

## 2018-12-22 ENCOUNTER — APPOINTMENT (OUTPATIENT)
Dept: GENERAL RADIOLOGY | Facility: HOSPITAL | Age: 43
End: 2018-12-22

## 2018-12-22 VITALS
OXYGEN SATURATION: 92 % | HEART RATE: 88 BPM | WEIGHT: 264.4 LBS | SYSTOLIC BLOOD PRESSURE: 164 MMHG | RESPIRATION RATE: 18 BRPM | HEIGHT: 63 IN | BODY MASS INDEX: 46.85 KG/M2 | DIASTOLIC BLOOD PRESSURE: 83 MMHG | TEMPERATURE: 97.9 F

## 2018-12-22 LAB
ALBUMIN SERPL-MCNC: 4.19 G/DL (ref 3.2–4.8)
ALBUMIN/GLOB SERPL: 2.5 G/DL (ref 1.5–2.5)
ALP SERPL-CCNC: 57 U/L (ref 25–100)
ALT SERPL W P-5'-P-CCNC: 25 U/L (ref 7–40)
ANION GAP SERPL CALCULATED.3IONS-SCNC: 6 MMOL/L (ref 3–11)
AST SERPL-CCNC: 26 U/L (ref 0–33)
BASOPHILS # BLD AUTO: 0 10*3/MM3 (ref 0–0.2)
BASOPHILS NFR BLD AUTO: 0 % (ref 0–1)
BILIRUB SERPL-MCNC: 0.5 MG/DL (ref 0.3–1.2)
BUN BLD-MCNC: 9 MG/DL (ref 9–23)
BUN/CREAT SERPL: 17.3 (ref 7–25)
CALCIUM SPEC-SCNC: 8.7 MG/DL (ref 8.7–10.4)
CHLORIDE SERPL-SCNC: 103 MMOL/L (ref 99–109)
CO2 SERPL-SCNC: 26 MMOL/L (ref 20–31)
CREAT BLD-MCNC: 0.52 MG/DL (ref 0.6–1.3)
DEPRECATED RDW RBC AUTO: 44.5 FL (ref 37–54)
EOSINOPHIL # BLD AUTO: 0 10*3/MM3 (ref 0–0.3)
EOSINOPHIL NFR BLD AUTO: 0 % (ref 0–3)
ERYTHROCYTE [DISTWIDTH] IN BLOOD BY AUTOMATED COUNT: 13.9 % (ref 11.3–14.5)
GFR SERPL CREATININE-BSD FRML MDRD: 129 ML/MIN/1.73
GLOBULIN UR ELPH-MCNC: 1.7 GM/DL
GLUCOSE BLD-MCNC: 114 MG/DL (ref 70–100)
HCT VFR BLD AUTO: 37.9 % (ref 34.5–44)
HGB BLD-MCNC: 12 G/DL (ref 11.5–15.5)
IMM GRANULOCYTES # BLD AUTO: 0.02 10*3/MM3 (ref 0–0.03)
IMM GRANULOCYTES NFR BLD AUTO: 0.2 % (ref 0–0.6)
IRON 24H UR-MRATE: 34 MCG/DL (ref 50–175)
LYMPHOCYTES # BLD AUTO: 1.01 10*3/MM3 (ref 0.6–4.8)
LYMPHOCYTES NFR BLD AUTO: 9.5 % (ref 24–44)
MCH RBC QN AUTO: 27.3 PG (ref 27–31)
MCHC RBC AUTO-ENTMCNC: 31.7 G/DL (ref 32–36)
MCV RBC AUTO: 86.3 FL (ref 80–99)
MONOCYTES # BLD AUTO: 0.36 10*3/MM3 (ref 0–1)
MONOCYTES NFR BLD AUTO: 3.4 % (ref 0–12)
NEUTROPHILS # BLD AUTO: 9.2 10*3/MM3 (ref 1.5–8.3)
NEUTROPHILS NFR BLD AUTO: 86.9 % (ref 41–71)
PLATELET # BLD AUTO: 213 10*3/MM3 (ref 150–450)
PMV BLD AUTO: 11.1 FL (ref 6–12)
POTASSIUM BLD-SCNC: 4.3 MMOL/L (ref 3.5–5.5)
PROT SERPL-MCNC: 5.9 G/DL (ref 5.7–8.2)
RBC # BLD AUTO: 4.39 10*6/MM3 (ref 3.89–5.14)
SODIUM BLD-SCNC: 135 MMOL/L (ref 132–146)
WBC NRBC COR # BLD: 10.59 10*3/MM3 (ref 3.5–10.8)

## 2018-12-22 PROCEDURE — 25010000002 PROMETHAZINE PER 50 MG: Performed by: SURGERY

## 2018-12-22 PROCEDURE — 25010000002 THIAMINE PER 100 MG: Performed by: SURGERY

## 2018-12-22 PROCEDURE — 83540 ASSAY OF IRON: CPT | Performed by: SURGERY

## 2018-12-22 PROCEDURE — 25010000002 NA FERRIC GLUC CPLX PER 12.5 MG

## 2018-12-22 PROCEDURE — 25010000002 ONDANSETRON PER 1 MG: Performed by: SURGERY

## 2018-12-22 PROCEDURE — 25010000002 ENOXAPARIN PER 10 MG: Performed by: SURGERY

## 2018-12-22 PROCEDURE — 25010000002 CYANOCOBALAMIN PER 1000 MCG: Performed by: SURGERY

## 2018-12-22 PROCEDURE — 25010000002 HYDROMORPHONE 1 MG/ML SOLUTION: Performed by: SURGERY

## 2018-12-22 PROCEDURE — 99024 POSTOP FOLLOW-UP VISIT: CPT | Performed by: SURGERY

## 2018-12-22 PROCEDURE — 74241: CPT

## 2018-12-22 PROCEDURE — 25010000003 CEFAZOLIN IN DEXTROSE 2-4 GM/100ML-% SOLUTION: Performed by: SURGERY

## 2018-12-22 PROCEDURE — 85025 COMPLETE CBC W/AUTO DIFF WBC: CPT | Performed by: SURGERY

## 2018-12-22 PROCEDURE — 80053 COMPREHEN METABOLIC PANEL: CPT | Performed by: SURGERY

## 2018-12-22 RX ORDER — PROMETHAZINE HYDROCHLORIDE 12.5 MG/1
12.5 TABLET ORAL EVERY 6 HOURS PRN
Status: DISCONTINUED | OUTPATIENT
Start: 2018-12-22 | End: 2018-12-22 | Stop reason: HOSPADM

## 2018-12-22 RX ORDER — HYDROCODONE BITARTRATE AND ACETAMINOPHEN 7.5; 325 MG/1; MG/1
1 TABLET ORAL EVERY 6 HOURS PRN
Qty: 30 TABLET | Refills: 0 | Status: SHIPPED | OUTPATIENT
Start: 2018-12-22 | End: 2019-01-25

## 2018-12-22 RX ORDER — ONDANSETRON 4 MG/1
4 TABLET, ORALLY DISINTEGRATING ORAL EVERY 8 HOURS PRN
Qty: 20 TABLET | Refills: 0 | Status: SHIPPED | OUTPATIENT
Start: 2018-12-22 | End: 2019-01-25

## 2018-12-22 RX ADMIN — CEFAZOLIN SODIUM 2 G: 2 INJECTION, SOLUTION INTRAVENOUS at 06:13

## 2018-12-22 RX ADMIN — PROMETHAZINE HYDROCHLORIDE 12.5 MG: 25 INJECTION INTRAMUSCULAR; INTRAVENOUS at 12:04

## 2018-12-22 RX ADMIN — CYANOCOBALAMIN 1000 MCG: 1000 INJECTION, SOLUTION INTRAMUSCULAR at 08:19

## 2018-12-22 RX ADMIN — HYDROMORPHONE HYDROCHLORIDE 1 MG: 1 INJECTION, SOLUTION INTRAMUSCULAR; INTRAVENOUS; SUBCUTANEOUS at 05:00

## 2018-12-22 RX ADMIN — LISINOPRIL 10 MG: 10 TABLET ORAL at 08:17

## 2018-12-22 RX ADMIN — ONDANSETRON 4 MG: 4 TABLET, FILM COATED ORAL at 08:18

## 2018-12-22 RX ADMIN — PANTOPRAZOLE SODIUM 40 MG: 40 INJECTION, POWDER, FOR SOLUTION INTRAVENOUS at 05:04

## 2018-12-22 RX ADMIN — SERTRALINE HYDROCHLORIDE 100 MG: 100 TABLET ORAL at 08:18

## 2018-12-22 RX ADMIN — ONDANSETRON 4 MG: 2 INJECTION INTRAMUSCULAR; INTRAVENOUS at 05:05

## 2018-12-22 RX ADMIN — LEVOTHYROXINE SODIUM 100 MCG: 100 TABLET ORAL at 05:04

## 2018-12-22 RX ADMIN — FOLIC ACID 250 ML/HR: 5 INJECTION, SOLUTION INTRAMUSCULAR; INTRAVENOUS; SUBCUTANEOUS at 08:19

## 2018-12-22 RX ADMIN — ENOXAPARIN SODIUM 40 MG: 40 INJECTION SUBCUTANEOUS at 14:21

## 2018-12-22 RX ADMIN — HYDROCODONE BITARTRATE AND ACETAMINOPHEN 1 TABLET: 7.5; 325 TABLET ORAL at 14:21

## 2018-12-22 RX ADMIN — SODIUM CHLORIDE 125 MG: 9 INJECTION, SOLUTION INTRAVENOUS at 14:21

## 2018-12-22 RX ADMIN — HYDROCODONE BITARTRATE AND ACETAMINOPHEN 1 TABLET: 7.5; 325 TABLET ORAL at 08:18

## 2018-12-22 NOTE — PLAN OF CARE
Problem: Patient Care Overview  Goal: Plan of Care Review   12/22/18 0617   Coping/Psychosocial   Plan of Care Reviewed With patient   Plan of Care Review   Progress improving   OTHER   Outcome Summary patient has had a good night, walks frequently, uses Inspiro often and up to 2000, pain controlled       Problem: Bariatric Surgery (Adult,Pediatric)  Goal: Signs and Symptoms of Listed Potential Problems Will be Absent, Minimized or Managed (Bariatric Surgery)   12/22/18 0617   Goal/Outcome Evaluation   Problems Assessed (Bariatric Surgery) all   Problems Present (Bariatric Surgery) pain;postoperative nausea and vomiting     Goal: Anesthesia/Sedation Recovery   12/22/18 0617   Goal/Outcome Evaluation   Anesthesia/Sedation Recovery progressing toward baseline;recovered to baseline

## 2018-12-22 NOTE — PLAN OF CARE
Problem: Patient Care Overview  Goal: Plan of Care Review  Outcome: Ongoing (interventions implemented as appropriate)   12/22/18 0955   Coping/Psychosocial   Plan of Care Reviewed With patient   Plan of Care Review   Progress improving   OTHER   Outcome Summary Ambulating independently, using IS, denies pain and nausea     Goal: Individualization and Mutuality  Outcome: Ongoing (interventions implemented as appropriate)   12/22/18 0955   Individualization   Patient Specific Interventions Monitor pain q2h and prn     Goal: Discharge Needs Assessment  Outcome: Ongoing (interventions implemented as appropriate)      Problem: Bariatric Surgery (Adult,Pediatric)  Goal: Signs and Symptoms of Listed Potential Problems Will be Absent, Minimized or Managed (Bariatric Surgery)  Outcome: Ongoing (interventions implemented as appropriate)   12/22/18 0955   Goal/Outcome Evaluation   Problems Assessed (Bariatric Surgery) all   Problems Present (Bariatric Surgery) none     Goal: Anesthesia/Sedation Recovery  Outcome: Ongoing (interventions implemented as appropriate)   12/22/18 0955   Goal/Outcome Evaluation   Anesthesia/Sedation Recovery progressing toward baseline

## 2018-12-22 NOTE — PROGRESS NOTES
"Bariatric Surgery Progress Note:      Chief Complaint:  POD #1    Subjective     Interval History:  Doing well.  No complaints.  Pain controlled.  Denies N/V.  No fevers.  Ambulating.  Voiding.  IS [id number].   in room, very supportive, encouraging her to walk and breathe.  Excellent oral intake.    Objective     Vital Signs  Blood pressure 135/100, pulse 87, temperature 98 °F (36.7 °C), temperature source Oral, resp. rate 18, height 160 cm (63\"), weight 120 kg (264 lb 6.4 oz), last menstrual period 12/11/2018, SpO2 92 %, not currently breastfeeding.      Intake/Output Summary (Last 24 hours) at 12/22/2018 1521  Last data filed at 12/22/2018 1500  Gross per 24 hour   Intake 1420 ml   Output 1200 ml   Net 220 ml       Physical Exam:  General: Alert, NAD  Lungs: Clear  Heart: RRR  Abdomen: soft, appropriate, incisions okay  Extremities: warm, (+) SCDs       Labs:  Lab Results (last 24 hours)     Procedure Component Value Units Date/Time    Comprehensive Metabolic Panel [[id number]]  (Abnormal) Collected:  12/22/18 0614    Specimen:  Blood Updated:  12/22/18 0806     Glucose 114 mg/dL      BUN 9 mg/dL      Creatinine 0.52 mg/dL      Sodium 135 mmol/L      Potassium 4.3 mmol/L      Chloride 103 mmol/L      CO2 26.0 mmol/L      Calcium 8.7 mg/dL      Total Protein 5.9 g/dL      Albumin 4.19 g/dL      ALT (SGPT) 25 U/L      AST (SGOT) 26 U/L      Alkaline Phosphatase 57 U/L      Total Bilirubin 0.5 mg/dL      eGFR Non African Amer 129 mL/min/1.73      Globulin 1.7 gm/dL      A/G Ratio 2.5 g/dL      BUN/Creatinine Ratio 17.3     Anion Gap 6.0 mmol/L     Narrative:       [employer] Guidelines    Stage     Description        GFR  1         Normal or High     90+  2         Mild decrease      60-89  3         Moderate decrease  30-59  4         Severe decrease    15-29  5         Kidney failure     <15    The MDRD GFR formula is only valid for adults with stable renal function between ages 18 and 70.    " Iron [[id number]]  (Abnormal) Collected:  12/22/18 0614    Specimen:  Blood Updated:  12/22/18 0806     Iron 34 mcg/dL     CBC & Differential [[id number]] Collected:  12/22/18 0614    Specimen:  Blood Updated:  12/22/18 0713    Narrative:       The following orders were created for panel order CBC & Differential.  Procedure                               Abnormality         Status                     ---------                               -----------         ------                     CBC Auto Differential[[id number]]        Abnormal            Final result                 Please view results for these tests on the individual orders.    CBC Auto Differential [[id number]]  (Abnormal) Collected:  12/22/18 0614    Specimen:  Blood Updated:  12/22/18 0713     WBC 10.59 10*3/mm3      RBC 4.39 10*6/mm3      Hemoglobin 12.0 g/dL      Hematocrit 37.9 %      MCV 86.3 fL      MCH 27.3 pg      MCHC 31.7 g/dL      RDW 13.9 %      RDW-SD 44.5 fl      MPV 11.1 fL      Platelets 213 10*3/mm3      Neutrophil % 86.9 %      Lymphocyte % 9.5 %      Monocyte % 3.4 %      Eosinophil % 0.0 %      Basophil % 0.0 %      Immature Grans % 0.2 %      Neutrophils, Absolute 9.20 10*3/mm3      Lymphocytes, Absolute 1.01 10*3/mm3      Monocytes, Absolute 0.36 10*3/mm3      Eosinophils, Absolute 0.00 10*3/mm3      Basophils, Absolute 0.00 10*3/mm3      Immature Grans, Absolute 0.02 10*3/mm3     Tissue Pathology Exam [[id number]] Collected:  12/21/18 1647    Specimen:  Tissue from Stomach Updated:  12/22/18 0531            Assessment/Plan     POD # 1 s/p LSG/HHR.    Doing well.  UGI normal post-sleeve, images and report reviewed.  IV iron ordered for low Fe without evidence of bleeding on Lovenox.  Patient feels well and has met discharge criteria.  Will discharge home with follow up in 1 week with PA.  Rx given for Lortab, Zofran.  She has Protonix 40 mg already with 1 refill.   Discharge instructions reviewed with patient and all questions answered.         12/22/18  3:21 PM  Mahogany Andrew MD

## 2018-12-23 ENCOUNTER — READMISSION MANAGEMENT (OUTPATIENT)
Dept: CALL CENTER | Facility: HOSPITAL | Age: 43
End: 2018-12-23

## 2018-12-24 LAB
CYTO UR: NORMAL
LAB AP CASE REPORT: NORMAL
LAB AP CLINICAL INFORMATION: NORMAL
PATH REPORT.FINAL DX SPEC: NORMAL
PATH REPORT.GROSS SPEC: NORMAL

## 2018-12-24 NOTE — OUTREACH NOTE
Prep Survey      Responses   Facility patient discharged from?  [place]   Is patient eligible?  Yes   Discharge diagnosis  S/P GASTRIC SLEEVE LAP   Does the patient have one of the following disease processes/diagnoses(primary or secondary)?  General Surgery   Does the patient have Home health ordered?  No   Is there a DME ordered?  No   Prep survey completed?  Yes          Kira Reyna RN

## 2018-12-26 NOTE — PAYOR COMM NOTE
"Florencia Dsouza RN Utilization Review [phone]  Fax # [phone]  Ref # [id number]      Please note discharge date.     Tania Delacruz (43 y.o. Female)     Date of Birth Social Security Number Address Home Phone MRN    1975  [address] [address] [phone] [id number]    Anglican Marital Status          Episcopalian        Admission Date Admission Type Admitting Provider Attending Provider Department, Room/Bed    12/21/18 Elective Tyler Dobbins MD  [facility] 2F, S211/1    Discharge Date Discharge Disposition Discharge Destination        12/22/2018 Home or Self Care              Attending Provider:  (none)   Allergies:  Celexa [Citalopram Hydrobromide], Morphine    Isolation:  None   Infection:  None   Code Status:  Prior    Ht:  160 cm (63\")   Wt:  120 kg (264 lb 6.4 oz)    Admission Cmt:  None   Principal Problem:  None                Active Insurance as of 12/21/2018     Primary Coverage     Payor Plan Insurance Group Employer/Plan Group    [employer] [id number]     Payor Plan Address Payor Plan Phone Number Payor Plan Fax Number Effective Dates    [address] [phone]  1/1/2016 - None Entered    [facility] [phone]       Subscriber Name Subscriber Birth Date Member ID       TANIA DELACRUZ 1975 [id number]                 Emergency Contacts      (Rel.) Home Phone Work Phone Mobile Phone    Fuad Delacruz (Spouse) [phone] -- --               Physician Progress Notes (most recent note)      Mahogany Andrew MD at 12/22/2018  3:21 PM          Bariatric Surgery Progress Note:      Chief Complaint:  POD #1    Subjective     Interval History:  Doing well.  No complaints.  Pain controlled.  Denies N/V.  No fevers.  Ambulating.  Voiding.  IS [id number].   in room, very supportive, encouraging her to walk and breathe.  Excellent oral intake.    Objective     Vital Signs  Blood pressure 135/100, pulse " "87, temperature 98 °F (36.7 °C), temperature source Oral, resp. rate 18, height 160 cm (63\"), weight 120 kg (264 lb 6.4 oz), last menstrual period 12/11/2018, SpO2 92 %, not currently breastfeeding.      Intake/Output Summary (Last 24 hours) at 12/22/2018 1521  Last data filed at 12/22/2018 1500  Gross per 24 hour   Intake 1420 ml   Output 1200 ml   Net 220 ml       Physical Exam:  General: Alert, NAD  Lungs: Clear  Heart: RRR  Abdomen: soft, appropriate, incisions okay  Extremities: warm, (+) SCDs       Labs:  Lab Results (last 24 hours)     Procedure Component Value Units Date/Time    Comprehensive Metabolic Panel [[id number]]  (Abnormal) Collected:  12/22/18 0614    Specimen:  Blood Updated:  12/22/18 0806     Glucose 114 mg/dL      BUN 9 mg/dL      Creatinine 0.52 mg/dL      Sodium 135 mmol/L      Potassium 4.3 mmol/L      Chloride 103 mmol/L      CO2 26.0 mmol/L      Calcium 8.7 mg/dL      Total Protein 5.9 g/dL      Albumin 4.19 g/dL      ALT (SGPT) 25 U/L      AST (SGOT) 26 U/L      Alkaline Phosphatase 57 U/L      Total Bilirubin 0.5 mg/dL      eGFR Non African Amer 129 mL/min/1.73      Globulin 1.7 gm/dL      A/G Ratio 2.5 g/dL      BUN/Creatinine Ratio 17.3     Anion Gap 6.0 mmol/L     Narrative:       National Kidney Foundation Guidelines    Stage     Description        GFR  1         Normal or High     90+  2         Mild decrease      60-89  3         Moderate decrease  30-59  4         Severe decrease    15-29  5         Kidney failure     <15    The MDRD GFR formula is only valid for adults with stable renal function between ages 18 and 70.    Iron [[id number]]  (Abnormal) Collected:  12/22/18 0614    Specimen:  Blood Updated:  12/22/18 0806     Iron 34 mcg/dL     CBC & Differential [[id number]] Collected:  12/22/18 0614    Specimen:  Blood Updated:  12/22/18 0713    Narrative:       The following orders were created for panel order CBC & Differential.  Procedure                               Abnormality "         Status                     ---------                               -----------         ------                     CBC Auto Differential[[id number]]        Abnormal            Final result                 Please view results for these tests on the individual orders.    CBC Auto Differential [[id number]]  (Abnormal) Collected:  12/22/18 0614    Specimen:  Blood Updated:  12/22/18 0713     WBC 10.59 10*3/mm3      RBC 4.39 10*6/mm3      Hemoglobin 12.0 g/dL      Hematocrit 37.9 %      MCV 86.3 fL      MCH 27.3 pg      MCHC 31.7 g/dL      RDW 13.9 %      RDW-SD 44.5 fl      MPV 11.1 fL      Platelets 213 10*3/mm3      Neutrophil % 86.9 %      Lymphocyte % 9.5 %      Monocyte % 3.4 %      Eosinophil % 0.0 %      Basophil % 0.0 %      Immature Grans % 0.2 %      Neutrophils, Absolute 9.20 10*3/mm3      Lymphocytes, Absolute 1.01 10*3/mm3      Monocytes, Absolute 0.36 10*3/mm3      Eosinophils, Absolute 0.00 10*3/mm3      Basophils, Absolute 0.00 10*3/mm3      Immature Grans, Absolute 0.02 10*3/mm3     Tissue Pathology Exam [[id number]] Collected:  12/21/18 1647    Specimen:  Tissue from Stomach Updated:  12/22/18 0531            Assessment/Plan     POD # 1 s/p LSG/HHR.    Doing well.  UGI normal post-sleeve, images and report reviewed.  IV iron ordered for low Fe without evidence of bleeding on Lovenox.  Patient feels well and has met discharge criteria.  Will discharge home with follow up in 1 week with PA.  Rx given for Lortab, Zofran.  She has Protonix 40 mg already with 1 refill.   Discharge instructions reviewed with patient and all questions answered.        12/22/18  3:21 PM  Mahogany Andrew MD       Electronically signed by Mahogany Andrew MD at 12/22/2018  3:22 PM       Discharge Summary     No notes of this type exist for this encounter.        Discharge Order (From admission, onward)    Start     Ordered    12/22/18 1523  Discharge patient  Once     Expected Discharge Date:  12/22/18     Discharge Disposition:  Home or Self Care    Physician of Record for Attribution - Please select from Treatment Team:  CELESTE PIMENTEL [[id number]]    Review needed by CMO to determine Physician of Record:  No       Question Answer Comment   Physician of Record for Attribution - Please select from Treatment Team CELESTE PIMENTEL    Review needed by CMO to determine Physician of Record No        12/22/18 1524

## 2018-12-27 ENCOUNTER — READMISSION MANAGEMENT (OUTPATIENT)
Dept: CALL CENTER | Facility: HOSPITAL | Age: 43
End: 2018-12-27

## 2018-12-27 NOTE — OUTREACH NOTE
General Surgery Week 1 Survey      Responses   Facility patient discharged from?  [facility]   Does the patient have one of the following disease processes/diagnoses(primary or secondary)?  General Surgery   Is there a successful TCM telephone encounter documented?  No   Week 1 attempt successful?  No   Unsuccessful attempts  Attempt 1          Rula Damon RN

## 2018-12-28 ENCOUNTER — OFFICE VISIT (OUTPATIENT)
Dept: BARIATRICS/WEIGHT MGMT | Facility: CLINIC | Age: 43
End: 2018-12-28

## 2018-12-28 VITALS
BODY MASS INDEX: 45.36 KG/M2 | DIASTOLIC BLOOD PRESSURE: 72 MMHG | HEART RATE: 67 BPM | TEMPERATURE: 97.9 F | WEIGHT: 246.5 LBS | SYSTOLIC BLOOD PRESSURE: 122 MMHG | RESPIRATION RATE: 18 BRPM | HEIGHT: 62 IN | OXYGEN SATURATION: 99 %

## 2018-12-28 DIAGNOSIS — E66.01 OBESITY, CLASS III, BMI 40-49.9 (MORBID OBESITY) (HCC): ICD-10-CM

## 2018-12-28 DIAGNOSIS — Z98.84 STATUS POST BARIATRIC SURGERY: Primary | ICD-10-CM

## 2018-12-28 PROCEDURE — 99024 POSTOP FOLLOW-UP VISIT: CPT | Performed by: PHYSICIAN ASSISTANT

## 2018-12-28 RX ORDER — URSODIOL 300 MG/1
300 CAPSULE ORAL 2 TIMES DAILY
Qty: 60 CAPSULE | Refills: 5 | Status: SHIPPED | OUTPATIENT
Start: 2018-12-28 | End: 2019-01-27

## 2018-12-28 NOTE — PROGRESS NOTES
[facility] Bariatric Surgery  [address]  [facility] [address] [phone]      Patient Name:  Tania Delacruz.  :  1975      Date of Visit: 2018      Reason for Visit:  POD #7    HPI:  Tania Delacruz is a 43 y.o. female s/p LSG/ HHR by  on 18    Discharged POD#1, IV iron given. Rx lortab, zofran.     Doing well, feeling good.  No issues/concerns. Not requiring antiemetics or pain meds. Denies dysphagia, reflux, nausea, vomiting, abdominal pain, pulmonary issues and fevers.  Tolerating diet progression - on stage 1.  Getting 65-90g prot/day, premier shakes.  Drinking 64+ fluid oz/day.  Has not started vitamins yet.  On Pantoprazole.  Holding ASA , NSAIDs , Tramadol, Hormones, Diuretics , Steroids and Immunologics.  Ambulating- active.     Presurgery weight: 264 pounds.  Today's weight is 112 kg (246 lb 8 oz) pounds, today's  Body mass index is 45.09 kg/m²., and her weight loss since surgery is 18 pounds.       Final Diagnosis   STOMACH, PARTIAL GASTRECTOMY:  Mild mucosal reactive change; negative for H.pylori on routine stain.   Negative for active inflammation, intestinal metaplasia, and neoplasia.   Unremarkable submucosal and muscular layers.          Past Medical History:   Diagnosis Date   • Anxiety    • Back pain    • Fatigue    • GERD (gastroesophageal reflux disease)     daily, some relief with Zantac.  Took samples of Dexilant for a while, but couldn't tolerate due to lightheadedness/fatigue that it caused.  The PPI did help Reflux.  Was on Prilosec, but stopped working after starting thyroid meds.  EGD [facility]  HH, Gr II esophagitis, 34 cm. path antrum neg h. pyl, path DE reflux   • Hypertension    • Hypothyroidism    • Low HDL (under 40)    • Osteoarthritis    • Wears glasses      Past Surgical History:   Procedure Laterality Date   • ANKLE SURGERY     •  SECTION  ,    • ENDOSCOPY  2018   •  ESOPHAGOGASTRODUODENOSCOPY N/A 2018    Performed by [facility] Dobbins MD at  [facility]   • GASTRIC SLEEVE LAPAROSCOPIC N/A 2018    Performed by Tyler Dobbins MD at  [facility]   • HIATAL HERNIA REPAIR LAPAROSCOPIC N/A 2018    Performed by Tyler Dobbins MD at  [facility]     Outpatient Medications Marked as Taking for the 18 encounter (Office Visit) with Liset Rush PA-C   Medication Sig Dispense Refill   • clonazePAM (KlonoPIN) 0.5 MG tablet Take 0.5 mg by mouth 2 (Two) Times a Day As Needed for Anxiety.     • levothyroxine (SYNTHROID, LEVOTHROID) 100 MCG tablet Take 100 mcg by mouth Daily.     • lisinopril (PRINIVIL,ZESTRIL) 10 MG tablet Take 10 mg by mouth Every Morning.  0   • pantoprazole (PROTONIX) 40 MG EC tablet Take 40 mg by mouth Daily.  1   • sertraline (ZOLOFT) 100 MG tablet Take 100 mg by mouth Daily.       Allergies   Allergen Reactions   • Celexa [Citalopram Hydrobromide] Anxiety   • Morphine Itching       Social History     Socioeconomic History   • Marital status:      Spouse name: Not on file   • Number of children: Not on file   • Years of education: Not on file   • Highest education level: Not on file   Social Needs   • Financial resource strain: Not on file   • Food insecurity - worry: Not on file   • Food insecurity - inability: Not on file   • Transportation needs - medical: Not on file   • Transportation needs - non-medical: Not on file   Occupational History   • Occupation:      Employer: [employer]     Comment: job is standing   Tobacco Use   • Smoking status: Former Smoker     Packs/day: 1.00     Years: 17.00     Pack years: 17.00     Types: Cigarettes     Last attempt to quit: 2018     Years since quittin.1   • Smokeless tobacco: Never Used   Substance and Sexual Activity   • Alcohol use: No   • Drug use: No   • Sexual activity: Not on file   Other Topics Concern   • Not on file   Social History Narrative    Lives  "with  and child.         /72 (BP Location: Left arm, Patient Position: Sitting, Cuff Size: Large Adult)   Pulse 67   Temp 97.9 °F (36.6 °C) (Temporal)   Resp 18   Ht 157.5 cm (62\")   Wt 112 kg (246 lb 8 oz)   LMP 12/11/2018 Comment: Mammogram never info given   SpO2 99%   BMI 45.09 kg/m²   Physical Exam   Constitutional: She is oriented to person, place, and time. She appears well-developed and well-nourished.   HENT:   Head: Normocephalic and atraumatic.   Cardiovascular: Normal rate, regular rhythm and normal heart sounds.   Pulmonary/Chest: Effort normal and breath sounds normal. No respiratory distress. She has no wheezes.   Abdominal: Soft. Bowel sounds are normal. She exhibits no distension. There is no guarding.   Incisions healing well   Neurological: She is alert and oriented to person, place, and time.   Skin: Skin is warm and dry.   Psychiatric: She has a normal mood and affect. Her behavior is normal. Judgment and thought content normal.         Assessment:   POD #7 s/p LSG/ HHR by  on 12/21/18    ICD-10-CM ICD-9-CM   1. Status post bariatric surgery Z98.84 V45.86   2. Obesity, Class III, BMI 40-49.9 (morbid obesity) (CMS/HCC) E66.01 278.01         Plan:  Doing well. Continue to advance diet per manual.  Increase protein intake to 100g/day.  Increase exercise/activity as tolerated.  Reviewed lifting restrictions, nothing >25 lbs x 2 more weeks.  Start vitamins.  Continue PPI. Start actigall, Rx sent.  Continue to avoid ASA/NSAIDs/tramadol/tobacco x 6 weeks postop, steroids x 8 weeks postop.  Call w/ problems/concerns.    The patient was instructed to follow up in 3 weeks, sooner if needed.   "

## 2019-01-09 ENCOUNTER — TELEPHONE (OUTPATIENT)
Dept: BARIATRICS/WEIGHT MGMT | Facility: CLINIC | Age: 44
End: 2019-01-09

## 2019-01-09 NOTE — TELEPHONE ENCOUNTER
Pt called in stating that she is having pain in her left shoulder. Pt stated that she is not having pain anywhere else. Pt denies fever, and chills. Pt is getting in her protein, 100 grams or more and drinking all of her fluids. Please advise, thank you.

## 2019-01-09 NOTE — TELEPHONE ENCOUNTER
Notified pt that you can have referred pain into shoulders after LSG/HHR, but typically it is noticed after surgery improving in first week or so. I let her know that she needs to f/up in the office if her symptoms persist. I remind her no nsaids/asa/steroids. Pt verbalized understanding.

## 2019-01-25 ENCOUNTER — OFFICE VISIT (OUTPATIENT)
Dept: BARIATRICS/WEIGHT MGMT | Facility: CLINIC | Age: 44
End: 2019-01-25

## 2019-01-25 VITALS
BODY MASS INDEX: 43.24 KG/M2 | RESPIRATION RATE: 18 BRPM | DIASTOLIC BLOOD PRESSURE: 74 MMHG | WEIGHT: 235 LBS | OXYGEN SATURATION: 99 % | SYSTOLIC BLOOD PRESSURE: 122 MMHG | HEART RATE: 70 BPM | HEIGHT: 62 IN | TEMPERATURE: 97.9 F

## 2019-01-25 DIAGNOSIS — R10.13 DYSPEPSIA: Primary | ICD-10-CM

## 2019-01-25 DIAGNOSIS — I10 ESSENTIAL HYPERTENSION: ICD-10-CM

## 2019-01-25 DIAGNOSIS — Z98.84 S/P BARIATRIC SURGERY: ICD-10-CM

## 2019-01-25 DIAGNOSIS — E55.9 VITAMIN D DEFICIENCY: ICD-10-CM

## 2019-01-25 DIAGNOSIS — R79.0 ABNORMAL BLOOD LEVEL OF IRON: ICD-10-CM

## 2019-01-25 DIAGNOSIS — R53.83 FATIGUE, UNSPECIFIED TYPE: ICD-10-CM

## 2019-01-25 DIAGNOSIS — E66.01 MORBID OBESITY (HCC): ICD-10-CM

## 2019-01-25 PROCEDURE — 99024 POSTOP FOLLOW-UP VISIT: CPT | Performed by: PHYSICIAN ASSISTANT

## 2019-01-25 NOTE — PROGRESS NOTES
"[facility] Bariatric Surgery  [address]  [facility] [address] [phone]      Patient Name:  Tania Delacruz.  :  1975      Date of Visit: 2019      Reason for Visit:  5 weeks postop    HPI:  Tania Delacruz is a 43 y.o. female s/p LSG/HHR by GDW on 18    Feeling good today.  Notes that over the weekend she had persisting fullness and epigastric discomfort after advancing to stage 5 diet and trying [employer] chili.  Sx have since resolved.  She wonders if maybe she just ate too much too fast.  Has tried chili since w/out issue.  Only other issue is some pill dysphagia, but only in the evenings.  Now after further discussion suspects she is just not spacing out her meals and meds appropriately.  Says \"I just feel like I am winging it\" - no plan, no consistency.   Choosing only protein foods/choices, but getting only 75-90g prot/day.  Admits it is hard to get more in. Still doing at least 1 protein shake/day.  Drinking GatoradeZero mostly - doesn't really like drinking water in the winter.  Drinking 1-2 cups coffee/day.  On Pantoprazole daily w/ prn Zantac for breakthrough heartburn.  Not yet taking Actigall - says just couldn't stomach another pill.  Wearing MVI, B12, Vit D patches.  Not on any extra iron or Vit B1.   Just started back at the gym - treadmill + stairstepper.      Presurgery weight:  264 pounds. Today's weight is 107 kg (235 lb) pounds, today's Body mass index is 42.98 kg/m²., and her weight loss since surgery is 29 pounds.       Past Medical History:   Diagnosis Date   • Anxiety    • Back pain    • Fatigue    • GERD (gastroesophageal reflux disease)     daily, some relief with Zantac.  Took samples of Dexilant for a while, but couldn't tolerate due to lightheadedness/fatigue that it caused.  The PPI did help Reflux.  Was on Prilosec, but stopped working after starting thyroid meds.  EGD [facility]  HH, Gr II esophagitis, 34 cm. path antrum neg " h. pyl, path DE reflux   • Hypertension    • Hypothyroidism    • Low HDL (under 40)    • Osteoarthritis    • Wears glasses      Past Surgical History:   Procedure Laterality Date   • ANKLE SURGERY     •  SECTION  ,    • ENDOSCOPY  2018   • ESOPHAGOGASTRODUODENOSCOPY N/A 2018    Performed by [facility]ins MD at  [facility]   • GASTRIC SLEEVE LAPAROSCOPIC N/A 2018    Performed by [facility]ins MD at  [facility]   • HIATAL HERNIA REPAIR LAPAROSCOPIC N/A 2018    Performed by [facility]ins MD at  [facility]     Outpatient Medications Marked as Taking for the 19 encounter (Office Visit) with Elma Hickey PA   Medication Sig Dispense Refill   • clonazePAM (KlonoPIN) 0.5 MG tablet Take 0.5 mg by mouth 2 (Two) Times a Day As Needed for Anxiety.     • levothyroxine (SYNTHROID, LEVOTHROID) 100 MCG tablet Take 100 mcg by mouth Daily.     • lisinopril (PRINIVIL,ZESTRIL) 10 MG tablet Take 10 mg by mouth Every Morning.  0   • pantoprazole (PROTONIX) 40 MG EC tablet Take 40 mg by mouth Daily.  1   • sertraline (ZOLOFT) 100 MG tablet Take 100 mg by mouth Daily.       Allergies   Allergen Reactions   • Celexa [Citalopram Hydrobromide] Anxiety   • Morphine Itching       Social History     Socioeconomic History   • Marital status:      Spouse name: Not on file   • Number of children: Not on file   • Years of education: Not on file   • Highest education level: Not on file   Social Needs   • Financial resource strain: Not on file   • Food insecurity - worry: Not on file   • Food insecurity - inability: Not on file   • Transportation needs - medical: Not on file   • Transportation needs - non-medical: Not on file   Occupational History   • Occupation:      Employer: [employer]     Comment: job is standing   Tobacco Use   • Smoking status: Former Smoker     Packs/day: 1.00     Years: 17.00     Pack years: 17.00     Types: Cigarettes     Last  "attempt to quit: 2018     Years since quittin.2   • Smokeless tobacco: Never Used   Substance and Sexual Activity   • Alcohol use: No   • Drug use: No   • Sexual activity: Not on file   Other Topics Concern   • Not on file   Social History Narrative    Lives with  and child.         /74 (BP Location: Left arm, Patient Position: Sitting, Cuff Size: Large Adult)   Pulse 70   Temp 97.9 °F (36.6 °C) (Temporal)   Resp 18   Ht 157.5 cm (62\")   Wt 107 kg (235 lb)   SpO2 99%   BMI 42.98 kg/m²     Physical Exam   Constitutional: She appears well-developed and well-nourished. She is cooperative.   HENT:   Mouth/Throat: Oropharynx is clear and moist and mucous membranes are normal.   Eyes: Conjunctivae are normal. No scleral icterus.   Cardiovascular: Normal rate.   Pulmonary/Chest: Effort normal.   Abdominal: Soft. There is no tenderness.   Incisions well healed   Musculoskeletal: Normal range of motion. She exhibits no edema.   Neurological: She is alert.   Skin: Skin is warm and dry. No rash noted.   Psychiatric: She has a normal mood and affect. Judgment normal.         Assessment:  5 weeks s/p LSG/HHR by GDW on 18    ICD-10-CM ICD-9-CM   1. Dyspepsia R10.13 536.8   2. Fatigue, unspecified type R53.83 780.79   3. Abnormal blood level of iron R79.0 790.6   4. Vitamin D deficiency E55.9 268.9   5. Essential hypertension I10 401.9   6. Morbid obesity (CMS/HCC) E66.01 278.01   7. S/P bariatric surgery Z98.84 V45.86       Plan:  Doing fine.  Discussed UGI for further eval but patient declines.  She will continue to be mindful of her eating behavior and will notify us if issues recur.  Continue to advance diet per manual.  Continue protein >70g/day, preferably 100g/day.  Encouraged good food choices - high protein, low carb.  Follow up w/ dietitian for further direction/assistance if needed.  Continue routine exercise - no restrictions.  Routine bariatric labs ordered (R).  Start daily " Vitamin B1 supplement.  Additional vitamin adjustments pending lab results.  Start Actigall as prescribed.  Continue PPI.  Continue to avoid ASA/NSAIDs/tobacco x 6 weeks postop, steroids x 8 weeks postop.   Call w/ problems/concerns.    The patient was instructed to follow up in 6 weeks, sooner if needed.

## 2019-05-30 ENCOUNTER — LAB (OUTPATIENT)
Dept: LAB | Facility: HOSPITAL | Age: 44
End: 2019-05-30

## 2019-05-30 DIAGNOSIS — I10 ESSENTIAL HYPERTENSION: ICD-10-CM

## 2019-05-30 DIAGNOSIS — E55.9 VITAMIN D DEFICIENCY: ICD-10-CM

## 2019-05-30 DIAGNOSIS — R79.0 ABNORMAL BLOOD LEVEL OF IRON: ICD-10-CM

## 2019-05-30 DIAGNOSIS — R10.13 DYSPEPSIA: ICD-10-CM

## 2019-05-30 DIAGNOSIS — R53.83 FATIGUE, UNSPECIFIED TYPE: ICD-10-CM

## 2019-05-30 LAB
25(OH)D3 SERPL-MCNC: 40 NG/ML
ALBUMIN SERPL-MCNC: 4.4 G/DL (ref 3.5–5)
ALBUMIN/GLOB SERPL: 1.5 G/DL (ref 1–2)
ALP SERPL-CCNC: 75 U/L (ref 38–126)
ALT SERPL W P-5'-P-CCNC: 33 U/L (ref 13–69)
ANION GAP SERPL CALCULATED.3IONS-SCNC: 13.1 MMOL/L (ref 10–20)
AST SERPL-CCNC: 21 U/L (ref 15–46)
BASOPHILS # BLD AUTO: 0.06 10*3/MM3 (ref 0–0.2)
BASOPHILS NFR BLD AUTO: 1 % (ref 0–1.5)
BILIRUB SERPL-MCNC: 0.3 MG/DL (ref 0.2–1.3)
BUN BLD-MCNC: 13 MG/DL (ref 7–20)
BUN/CREAT SERPL: 16.3 (ref 7.1–23.5)
CALCIUM SPEC-SCNC: 9.4 MG/DL (ref 8.4–10.2)
CHLORIDE SERPL-SCNC: 102 MMOL/L (ref 98–107)
CO2 SERPL-SCNC: 28 MMOL/L (ref 26–30)
CREAT BLD-MCNC: 0.8 MG/DL (ref 0.6–1.3)
DEPRECATED RDW RBC AUTO: 45 FL (ref 37–54)
EOSINOPHIL # BLD AUTO: 0.18 10*3/MM3 (ref 0–0.4)
EOSINOPHIL NFR BLD AUTO: 3.1 % (ref 0.3–6.2)
ERYTHROCYTE [DISTWIDTH] IN BLOOD BY AUTOMATED COUNT: 13.8 % (ref 12.3–15.4)
FERRITIN SERPL-MCNC: 14.6 NG/ML (ref 6.24–137)
FOLATE SERPL-MCNC: 7.26 NG/ML
GFR SERPL CREATININE-BSD FRML MDRD: 78 ML/MIN/1.73
GLOBULIN UR ELPH-MCNC: 2.9 GM/DL
GLUCOSE BLD-MCNC: 78 MG/DL (ref 74–98)
HCT VFR BLD AUTO: 42.1 % (ref 34–46.6)
HGB BLD-MCNC: 13.6 G/DL (ref 12–15.9)
IMM GRANULOCYTES # BLD AUTO: 0.02 10*3/MM3 (ref 0–0.05)
IMM GRANULOCYTES NFR BLD AUTO: 0.3 % (ref 0–0.5)
IRON 24H UR-MRATE: 55 MCG/DL (ref 37–181)
LYMPHOCYTES # BLD AUTO: 1.94 10*3/MM3 (ref 0.7–3.1)
LYMPHOCYTES NFR BLD AUTO: 33.9 % (ref 19.6–45.3)
MCH RBC QN AUTO: 28.8 PG (ref 26.6–33)
MCHC RBC AUTO-ENTMCNC: 32.3 G/DL (ref 31.5–35.7)
MCV RBC AUTO: 89.2 FL (ref 79–97)
MONOCYTES # BLD AUTO: 0.3 10*3/MM3 (ref 0.1–0.9)
MONOCYTES NFR BLD AUTO: 5.2 % (ref 5–12)
NEUTROPHILS # BLD AUTO: 3.22 10*3/MM3 (ref 1.7–7)
NEUTROPHILS NFR BLD AUTO: 56.5 % (ref 42.7–76)
NRBC BLD AUTO-RTO: 0 /100 WBC (ref 0–0.2)
PLATELET # BLD AUTO: 214 10*3/MM3 (ref 140–450)
PMV BLD AUTO: 12 FL (ref 6–12)
POTASSIUM BLD-SCNC: 4.1 MMOL/L (ref 3.5–5.1)
PREALB SERPL-MCNC: 20 MG/DL (ref 17.6–36)
PROT SERPL-MCNC: 7.3 G/DL (ref 6.3–8.2)
RBC # BLD AUTO: 4.72 10*6/MM3 (ref 3.77–5.28)
SODIUM BLD-SCNC: 139 MMOL/L (ref 137–145)
WBC NRBC COR # BLD: 5.72 10*3/MM3 (ref 3.4–10.8)

## 2019-05-30 PROCEDURE — 84425 ASSAY OF VITAMIN B-1: CPT

## 2019-05-30 PROCEDURE — 83540 ASSAY OF IRON: CPT

## 2019-05-30 PROCEDURE — 36415 COLL VENOUS BLD VENIPUNCTURE: CPT

## 2019-05-30 PROCEDURE — 82746 ASSAY OF FOLIC ACID SERUM: CPT

## 2019-05-30 PROCEDURE — 83921 ORGANIC ACID SINGLE QUANT: CPT

## 2019-05-30 PROCEDURE — 85025 COMPLETE CBC W/AUTO DIFF WBC: CPT

## 2019-05-30 PROCEDURE — 82728 ASSAY OF FERRITIN: CPT

## 2019-05-30 PROCEDURE — 82306 VITAMIN D 25 HYDROXY: CPT

## 2019-05-30 PROCEDURE — 80053 COMPREHEN METABOLIC PANEL: CPT

## 2019-05-30 PROCEDURE — 84134 ASSAY OF PREALBUMIN: CPT

## 2019-05-31 ENCOUNTER — OFFICE VISIT (OUTPATIENT)
Dept: BARIATRICS/WEIGHT MGMT | Facility: CLINIC | Age: 44
End: 2019-05-31

## 2019-05-31 VITALS
HEIGHT: 62 IN | SYSTOLIC BLOOD PRESSURE: 122 MMHG | WEIGHT: 206 LBS | OXYGEN SATURATION: 99 % | BODY MASS INDEX: 37.91 KG/M2 | HEART RATE: 58 BPM | TEMPERATURE: 97.7 F | DIASTOLIC BLOOD PRESSURE: 72 MMHG | RESPIRATION RATE: 18 BRPM

## 2019-05-31 DIAGNOSIS — Z98.84 S/P BARIATRIC SURGERY: ICD-10-CM

## 2019-05-31 DIAGNOSIS — E66.9 OBESITY, CLASS II, BMI 35-39.9: ICD-10-CM

## 2019-05-31 DIAGNOSIS — R10.32 LLQ ABDOMINAL PAIN: ICD-10-CM

## 2019-05-31 DIAGNOSIS — K21.9 GASTROESOPHAGEAL REFLUX DISEASE, ESOPHAGITIS PRESENCE NOT SPECIFIED: Primary | ICD-10-CM

## 2019-05-31 PROBLEM — E66.813 OBESITY, CLASS III, BMI 40-49.9 (MORBID OBESITY): Status: RESOLVED | Noted: 2018-12-10 | Resolved: 2019-05-31

## 2019-05-31 PROBLEM — E66.01 OBESITY, CLASS III, BMI 40-49.9 (MORBID OBESITY) (HCC): Status: RESOLVED | Noted: 2018-12-10 | Resolved: 2019-05-31

## 2019-05-31 PROCEDURE — 99214 OFFICE O/P EST MOD 30 MIN: CPT | Performed by: PHYSICIAN ASSISTANT

## 2019-05-31 RX ORDER — PANTOPRAZOLE SODIUM 40 MG/1
40 TABLET, DELAYED RELEASE ORAL 2 TIMES DAILY
Qty: 180 TABLET | Refills: 1 | Status: SHIPPED | OUTPATIENT
Start: 2019-05-31 | End: 2019-10-09 | Stop reason: SDUPTHER

## 2019-05-31 NOTE — PROGRESS NOTES
"[facility]ariat[facility]  [address]  [facility] [address] [phone]      Patient Name:  Tania Delacruz.  :  1975      Date of Visit: 2019      Reason for Visit:  Routine eval - 5 months postop    HPI:  Tania Delacruz is a 44 y.o. female s/p LSG/HHR by [facility] on 18    LOV @ 5 wks postop.  Missed schedule f/up visits thereafter.  Forgot about getting postop labs done until just yesterday.  Says feeling really good.  Only issue is persisting heartburn (did have issues preop as well).  Taking Protonix BID now to control symptoms.  Denies dysphagia/N/V.  Tolerated diet progression well.  Getting 60-80g prot/day.  Notes episodic LLQ pains which makes her worry, but sx usually quickly resolve - wonders if it may be constipation related.  Admits she struggles with water intake.  Loved water before surgery, but has trouble drinking now.  Walking on breaks at work.  Planning to get back to the gym, hoping to go before work.      Labs 19 - Thiamine/MMA still (P), all others WNL.   Doing MVI gummy daily.      Feeling good today.  Notes that over the weekend she had persisting fullness and epigastric discomfort after advancing to stage 5 diet and trying [employer] chili.  Sx have since resolved.  She wonders if maybe she just ate too much too fast.  Has tried chili since w/out issue.  Only other issue is some pill dysphagia, but only in the evenings.  Now after further discussion suspects she is just not spacing out her meals and meds appropriately.  Says \"I just feel like I am winging it\" - no plan, no consistency.   Choosing only protein foods/choices, but getting only 75-90g prot/day.  Admits it is hard to get more in. Still doing at least 1 protein shake/day.  Drinking GatoradeZero mostly - doesn't really like drinking water in the winter.  Drinking 1-2 cups coffee/day.  On Pantoprazole daily w/ prn Zantac for breakthrough heartburn.  Not yet taking Actigall " - says just couldn't stomach another pill.  Wearing MVI, B12, Vit D patches.  Not on any extra iron or Vit B1.   Just started back at the gym - treadmill + stairstepper.      Presurgery weight:  264 pounds. Today's weight is 93.4 kg (206 lb) pounds, today's Body mass index is 37.68 kg/m²., and her weight loss since surgery is 58 pounds.       Past Medical History:   Diagnosis Date   • Anxiety    • Back pain    • Fatigue    • GERD (gastroesophageal reflux disease)     daily, some relief with Zantac.  Took samples of Dexilant for a while, but couldn't tolerate due to lightheadedness/fatigue that it caused.  The PPI did help Reflux.  Was on Prilosec, but stopped working after starting thyroid meds.  EGD GDW  HH, Gr II esophagitis, 34 cm. path antrum neg h. pyl, path DE reflux   • Hypertension    • Hypothyroidism    • Low HDL (under 40)    • Osteoarthritis    • Wears glasses      Past Surgical History:   Procedure Laterality Date   • ANKLE SURGERY     •  SECTION  ,    • ENDOSCOPY  2018   • ENDOSCOPY N/A 2018    Procedure: ESOPHAGOGASTRODUODENOSCOPY;  Surgeon: Tyler Dobbins MD;  Location:  [facility];  Service: Bariatric   • GASTRIC SLEEVE LAPAROSCOPIC N/A 2018    Procedure: GASTRIC SLEEVE LAPAROSCOPIC;  Surgeon: Tyler Dobbins MD;  Location:  [facility];  Service: Bariatric   • HIATAL HERNIA REPAIR N/A 2018    Procedure: HIATAL HERNIA REPAIR LAPAROSCOPIC;  Surgeon: Tyler Dobbins MD;  Location:  [facility];  Service: Bariatric     Outpatient Medications Marked as Taking for the 19 encounter (Office Visit) with Elma Hickey PA   Medication Sig Dispense Refill   • clonazePAM (KlonoPIN) 0.5 MG tablet Take 0.5 mg by mouth 2 (Two) Times a Day As Needed for Anxiety.     • levothyroxine (SYNTHROID, LEVOTHROID) 100 MCG tablet Take 100 mcg by mouth Daily.     • lisinopril (PRINIVIL,ZESTRIL) 10 MG tablet Take 10 mg by mouth Every Morning.  0   • pantoprazole  "(PROTONIX) 40 MG EC tablet Take 1 tablet by mouth 2 (Two) Times a Day. 180 tablet 1   • sertraline (ZOLOFT) 100 MG tablet Take 100 mg by mouth Daily.     • [DISCONTINUED] pantoprazole (PROTONIX) 40 MG EC tablet Take 40 mg by mouth Daily.  1     Allergies   Allergen Reactions   • Celexa [Citalopram Hydrobromide] Anxiety   • Morphine Itching       Social History     Socioeconomic History   • Marital status:      Spouse name: Not on file   • Number of children: Not on file   • Years of education: Not on file   • Highest education level: Not on file   Occupational History   • Occupation:      Employer: [employer]     Comment: job is standing   Tobacco Use   • Smoking status: Former Smoker     Packs/day: 1.00     Years: 17.00     Pack years: 17.00     Types: Cigarettes     Last attempt to quit: 2018     Years since quittin.5   • Smokeless tobacco: Never Used   Substance and Sexual Activity   • Alcohol use: No   • Drug use: No   Social History Narrative    Lives with  and child.         /72 (BP Location: Left arm, Patient Position: Sitting, Cuff Size: Large Adult)   Pulse 58   Temp 97.7 °F (36.5 °C) (Temporal)   Resp 18   Ht 157.5 cm (62\")   Wt 93.4 kg (206 lb)   SpO2 99%   BMI 37.68 kg/m²     Physical Exam   Constitutional: She appears well-developed and well-nourished. She is cooperative.   HENT:   Mouth/Throat: Oropharynx is clear and moist and mucous membranes are normal.   Eyes: Conjunctivae are normal. No scleral icterus.   Cardiovascular: Normal rate.   Pulmonary/Chest: Effort normal.   Abdominal: Soft. She exhibits no distension and no mass. There is no tenderness. There is no rebound and no guarding. No hernia.   incisions well healed   Musculoskeletal: Normal range of motion. She exhibits no edema.   Neurological: She is alert.   Skin: Skin is warm and dry. No rash noted.   Psychiatric: She has a normal mood and affect. Judgment normal.         Assessment:  5 " months s/p LSG/HHR by YVAN on 12/21/18    ICD-10-CM ICD-9-CM   1. Gastroesophageal reflux disease, esophagitis presence not specified K21.9 530.81   2. LLQ abdominal pain R10.32 789.04   3. Obesity, Class II, BMI 35-39.9 E66.9 278.00   4. S/P bariatric surgery Z98.84 V45.86       Plan:  Doing well.  Continue w/ good food choices and healthy habits.  Encouraged to increase daily protein intake.  Increase exercise as planned.  Continue current vitamin regimen w/ additional recommendations pending finalized lab results.  Continue Protonix BID - new RX escribed.  Discuss LLQ pain further w/ PCP if persists/recurs.  Call w/ any other questions/concerns.      The patient was instructed to follow up in 3 months, sooner if needed.

## 2019-06-02 LAB — VIT B1 BLD-SCNC: 131.1 NMOL/L (ref 66.5–200)

## 2019-06-03 LAB
Lab: NORMAL
METHYLMALONATE SERPL-SCNC: 220 NMOL/L (ref 0–378)

## 2019-10-09 RX ORDER — PANTOPRAZOLE SODIUM 40 MG/1
TABLET, DELAYED RELEASE ORAL
Qty: 180 TABLET | Refills: 0 | Status: SHIPPED | OUTPATIENT
Start: 2019-10-09

## 2022-06-28 ENCOUNTER — OFFICE VISIT (OUTPATIENT)
Dept: BARIATRICS/WEIGHT MGMT | Facility: CLINIC | Age: 47
End: 2022-06-28

## 2022-06-28 VITALS
SYSTOLIC BLOOD PRESSURE: 136 MMHG | HEART RATE: 69 BPM | BODY MASS INDEX: 36.03 KG/M2 | DIASTOLIC BLOOD PRESSURE: 88 MMHG | WEIGHT: 197 LBS | TEMPERATURE: 97.8 F | OXYGEN SATURATION: 99 %

## 2022-06-28 DIAGNOSIS — E55.9 VITAMIN D DEFICIENCY: ICD-10-CM

## 2022-06-28 DIAGNOSIS — R13.10 DYSPHAGIA, UNSPECIFIED TYPE: ICD-10-CM

## 2022-06-28 DIAGNOSIS — K80.71 CALCULUS OF GALLBLADDER AND BILE DUCT WITH OBSTRUCTION WITHOUT CHOLECYSTITIS: ICD-10-CM

## 2022-06-28 DIAGNOSIS — R53.83 FATIGUE, UNSPECIFIED TYPE: ICD-10-CM

## 2022-06-28 DIAGNOSIS — R10.11 RUQ PAIN: Primary | ICD-10-CM

## 2022-06-28 DIAGNOSIS — K21.9 HIATAL HERNIA WITH GERD: ICD-10-CM

## 2022-06-28 DIAGNOSIS — E03.9 HYPOTHYROIDISM, UNSPECIFIED TYPE: ICD-10-CM

## 2022-06-28 DIAGNOSIS — K21.9 GASTROESOPHAGEAL REFLUX DISEASE, UNSPECIFIED WHETHER ESOPHAGITIS PRESENT: ICD-10-CM

## 2022-06-28 DIAGNOSIS — K44.9 HIATAL HERNIA WITH GERD: ICD-10-CM

## 2022-06-28 PROBLEM — Z72.0 TOBACCO USE: Status: ACTIVE | Noted: 2022-06-28

## 2022-06-28 PROBLEM — M54.9 BACK PAIN: Status: ACTIVE | Noted: 2022-06-28

## 2022-06-28 PROBLEM — M19.90 OSTEOARTHRITIS: Status: ACTIVE | Noted: 2022-06-28

## 2022-06-28 PROBLEM — I10 HYPERTENSION: Status: ACTIVE | Noted: 2022-06-28

## 2022-06-28 PROBLEM — F41.9 ANXIETY: Status: ACTIVE | Noted: 2022-06-28

## 2022-06-28 PROCEDURE — 99204 OFFICE O/P NEW MOD 45 MIN: CPT | Performed by: PHYSICIAN ASSISTANT

## 2022-06-28 NOTE — PROGRESS NOTES
[facility] Bariatric Surgery  [address]  [address]  [facility] [address]  [phone]      Patient Name:  Tania Delacruz.  :  1975      Date of Visit: 2022      Reason for Visit:  dysphagia, reflux, abd.pain      HPI:  Tania Delacruz is a 47 y.o. female s/p LSG/HHR 18 [facility]     LOV @ 5 months postop.  Returns today w/ gallbladder concerns.  Dx w/ gallstones @ [facility] 2021 - ER eval w/ acute RUQ pain.  Has had worsening issues for the past 18 months.  c/o intermittent postprandial RUQ pain w/ nausea + chronic diarrhea.  Denies acholic stools.  No dark urine.  No fevers/chills.      Also c/o dysphagia/reflux/vomiting which she feels is another issue, worsening x 6+ months, despite taking BID Protonix 40mg.  Having more difficulty swallowing pills.      (+) tobacco use, trying to quit, has Chantix RX.  Denies ASA/NSAIDS/steroids.  No COVID hx.     Last bariatric labs 19 - WNL.  Not taking vitamins.      Presurgery weight:  264 pounds. Today's weight is 89.4 kg (197 lb) pounds, today's Body mass index is 36.03 kg/m²., and her weight loss since surgery is 67 pounds.       Past Medical History:   Diagnosis Date   • Anxiety    • Back pain    • Fatigue    • GERD (gastroesophageal reflux disease)     chronic - EGD [facility]  HH, Gr II esophagitis, 34 cm. path antrum neg h. pyl, path DE reflux   • Hypertension    • Hypothyroidism    • Low HDL (under 40)    • Osteoarthritis    • Tobacco use     trying to quit   • Wears glasses      Past Surgical History:   Procedure Laterality Date   • ANKLE SURGERY     •  SECTION     •  SECTION     • ENDOSCOPY N/A 2018    Procedure: ESOPHAGOGASTRODUODENOSCOPY;  Surgeon: Tyler Dobbins MD;  Location: [facility] OR;  Service: Bariatric   • GASTRIC SLEEVE LAPAROSCOPIC N/A 2018    Procedure: GASTRIC SLEEVE LAPAROSCOPIC;  Surgeon: Tyler Dobbins MD;  Location:  [facility];  Service: Bariatric    • HIATAL HERNIA REPAIR N/A 12/21/2018    Procedure: HIATAL HERNIA REPAIR LAPAROSCOPIC;  Surgeon: Tyler Dobbins MD;  Location: [facility];  Service: Bariatric     Outpatient Medications Marked as Taking for the 6/28/22 encounter (Office Visit) with Elma Hickey PA   Medication Sig Dispense Refill   • levothyroxine (SYNTHROID, LEVOTHROID) 100 MCG tablet Take 100 mcg by mouth Daily.     • pantoprazole (PROTONIX) 40 MG EC tablet TAKE 1 TABLET BY MOUTH TWICE DAILY 180 tablet 0   • sertraline (ZOLOFT) 100 MG tablet Take 100 mg by mouth Daily.       Allergies   Allergen Reactions   • Celexa [Citalopram Hydrobromide] Anxiety   • Dexilant [Dexlansoprazole] Dizziness     lightheaded, fatigue   • Morphine Itching       Social History     Socioeconomic History   • Marital status:    Tobacco Use   • Smoking status: Former Smoker     Packs/day: 1.00     Years: 17.00     Pack years: 17.00     Types: Cigarettes     Quit date: 11/12/2018     Years since quitting: 3.6   • Smokeless tobacco: Never Used   Substance and Sexual Activity   • Alcohol use: No   • Drug use: No       /88   Pulse 69   Temp 97.8 °F (36.6 °C)   Wt 89.4 kg (197 lb)   SpO2 99%   BMI 36.03 kg/m²     Physical Exam   Constitutional: She appears well-developed. She is cooperative.   wearing a mask   Eyes: Conjunctivae are normal. No scleral icterus.   Cardiovascular: Normal rate.   Pulmonary/Chest: Effort normal.   Abdominal: Soft. Bowel sounds are normal. She exhibits no distension and no mass. There is no abdominal tenderness. There is no rebound and no guarding. No hernia.   Musculoskeletal: Normal range of motion.   Neurological: She is alert.   Skin: Skin is warm and dry. No rash noted.   Psychiatric: Judgment normal.         Assessment:  3 years s/p LSG/HHR 12/21/18 [facility]        ICD-10-CM ICD-9-CM   1. RUQ pain  R10.11 789.01   2. Dysphagia, unspecified type  R13.10 787.20   3. Gastroesophageal reflux disease, unspecified whether  esophagitis present  K21.9 530.81   4. Hypothyroidism, unspecified type  E03.9 244.9   5. Fatigue, unspecified type  R53.83 780.79   6. Vitamin D deficiency  E55.9 268.9       Plan:  Labs ordered.  Records requested for review.  Will discuss further w/ Dr. Dobbins.  Additional input to follow.              Addendum:  CLEMENTE 8/2021 @[place] - cholelithiasis w/ dilated common duct (7mm)                       Labs 6/28/22 - Hgb 9.6, iron/ferritin low, TSH abnL, LFTs normal.     Tyler Dobbins MD [place], [facility] [place]    Needs an upper GI.  No need to repeat the ultrasound.  Okay to schedule laparoscopic cholecystectomy with possible intraoperative cholangiogram, possible recurrent hiatal hernia repair (depending on EGD findings), possible EGD.  Can do as outpatient in the main OR.  Agree with IV iron.  Type and screen.  Thanks!   ------------------------    Addendum:  UGI 7/15/22 @[facility] - reflux, moderate HH.      Will proceed w/ scheduling lap tala w/ poss IOC, lap recurrent HHR, possible EGD.

## 2022-07-01 LAB
25(OH)D3+25(OH)D2 SERPL-MCNC: 34.5 NG/ML (ref 30–100)
ALBUMIN SERPL-MCNC: 4 G/DL (ref 3.5–5.2)
ALBUMIN/GLOB SERPL: 1.4 G/DL
ALP SERPL-CCNC: 77 U/L (ref 39–117)
ALT SERPL-CCNC: 16 U/L (ref 1–33)
AMYLASE SERPL-CCNC: 54 U/L (ref 28–100)
AST SERPL-CCNC: 19 U/L (ref 1–32)
BASOPHILS # BLD AUTO: ABNORMAL 10*3/UL
BASOPHILS # BLD MANUAL: 0.19 10*3/MM3 (ref 0–0.2)
BASOPHILS NFR BLD MANUAL: 2 % (ref 0–1.5)
BILIRUB SERPL-MCNC: 0.3 MG/DL (ref 0–1.2)
BUN SERPL-MCNC: 12 MG/DL (ref 6–20)
BUN/CREAT SERPL: 18.8 (ref 7–25)
CALCIUM SERPL-MCNC: 9.4 MG/DL (ref 8.6–10.5)
CHLORIDE SERPL-SCNC: 100 MMOL/L (ref 98–107)
CO2 SERPL-SCNC: 26 MMOL/L (ref 22–29)
CREAT SERPL-MCNC: 0.64 MG/DL (ref 0.57–1)
DIFFERENTIAL COMMENT: ABNORMAL
EGFRCR SERPLBLD CKD-EPI 2021: 109.8 ML/MIN/1.73
EOSINOPHIL # BLD AUTO: ABNORMAL 10*3/UL
EOSINOPHIL # BLD MANUAL: 0.28 10*3/MM3 (ref 0–0.4)
EOSINOPHIL NFR BLD AUTO: ABNORMAL %
EOSINOPHIL NFR BLD MANUAL: 3 % (ref 0.3–6.2)
ERYTHROCYTE [DISTWIDTH] IN BLOOD BY AUTOMATED COUNT: 16.9 % (ref 12.3–15.4)
FERRITIN SERPL-MCNC: 5.67 NG/ML (ref 13–150)
FOLATE SERPL-MCNC: 4.96 NG/ML (ref 4.78–24.2)
GLOBULIN SER CALC-MCNC: 2.9 GM/DL
GLUCOSE SERPL-MCNC: 71 MG/DL (ref 65–99)
HCT VFR BLD AUTO: 32.8 % (ref 34–46.6)
HGB BLD-MCNC: 9.6 G/DL (ref 12–15.9)
IRON SERPL-MCNC: 18 MCG/DL (ref 37–145)
LIPASE SERPL-CCNC: 31 U/L (ref 13–60)
LYMPHOCYTES # BLD AUTO: ABNORMAL 10*3/UL
LYMPHOCYTES # BLD MANUAL: 1.91 10*3/MM3 (ref 0.7–3.1)
LYMPHOCYTES NFR BLD AUTO: ABNORMAL %
LYMPHOCYTES NFR BLD MANUAL: 20.2 % (ref 19.6–45.3)
MCH RBC QN AUTO: 19.6 PG (ref 26.6–33)
MCHC RBC AUTO-ENTMCNC: 29.3 G/DL (ref 31.5–35.7)
MCV RBC AUTO: 66.8 FL (ref 79–97)
METHYLMALONATE SERPL-SCNC: 203 NMOL/L (ref 0–378)
MONOCYTES # BLD MANUAL: 0.58 10*3/MM3 (ref 0.1–0.9)
MONOCYTES NFR BLD AUTO: ABNORMAL %
MONOCYTES NFR BLD MANUAL: 6.1 % (ref 5–12)
NEUTROPHILS # BLD MANUAL: 6.49 10*3/MM3 (ref 1.7–7)
NEUTROPHILS NFR BLD AUTO: ABNORMAL %
NEUTROPHILS NFR BLD MANUAL: 68.7 % (ref 42.7–76)
PLATELET # BLD AUTO: 237 10*3/MM3 (ref 140–450)
PLATELET BLD QL SMEAR: ABNORMAL
POTASSIUM SERPL-SCNC: 4.9 MMOL/L (ref 3.5–5.2)
PREALB SERPL-MCNC: 22 MG/DL (ref 12–34)
PROT SERPL-MCNC: 6.9 G/DL (ref 6–8.5)
RBC # BLD AUTO: 4.91 10*6/MM3 (ref 3.77–5.28)
RBC MORPH BLD: ABNORMAL
SODIUM SERPL-SCNC: 136 MMOL/L (ref 136–145)
TSH SERPL DL<=0.005 MIU/L-ACNC: 4.68 UIU/ML (ref 0.27–4.2)
VIT B1 BLD-SCNC: 141.5 NMOL/L (ref 66.5–200)
WBC # BLD AUTO: 9.44 10*3/MM3 (ref 3.4–10.8)

## 2022-07-07 ENCOUNTER — TELEPHONE (OUTPATIENT)
Dept: BARIATRICS/WEIGHT MGMT | Facility: CLINIC | Age: 47
End: 2022-07-07

## 2022-07-07 DIAGNOSIS — K21.9 GASTROESOPHAGEAL REFLUX DISEASE, UNSPECIFIED WHETHER ESOPHAGITIS PRESENT: ICD-10-CM

## 2022-07-07 DIAGNOSIS — R13.10 DYSPHAGIA, UNSPECIFIED TYPE: Primary | ICD-10-CM

## 2022-07-07 NOTE — TELEPHONE ENCOUNTER
Called and spoke with patient, relayed the message she would receive a call to schedule and UGI xray.     Patient stated verbal understanding of the message with no further questions.

## 2022-07-07 NOTE — TELEPHONE ENCOUNTER
Message from Tyler Dobbins MD sent at 7/7/2022  2:47 PM EDT     Regarding: RE: please advise      Yes, please get an upper GI.  No need to repeat the ultrasound.  Okay to schedule laparoscopic cholecystectomy with possible intraoperative cholangiogram, possible recurrent hiatal hernia repair (depending on EGD findings), possible EGD.  Can do as outpatient in the main OR.  Agree with IV iron.  Type and screen.  Thanks!    ----- Message -----  From: Elma Hickey PA  Sent: 7/7/2022   2:30 PM EDT  To: Tyler Dobbins MD  Subject: please advise                                    46 y/o s/p LSG/HHR 12/21/18 [facility]      LOV @ 5 months postop.  Returned most recently w/ gallbladder concerns.  Dx w/ gallstones @ [place] 8/2021 - ER eval w/ acute RUQ pain - GBUS (+) cholelithiasis w/ dilated common duct (7mm).  Has had worsening issues for the past 18 months.  c/o intermittent postprandial RUQ pain w/ nausea + chronic diarrhea.  Denies acholic stools.  No dark urine.  No fevers/chills.       Also c/o dysphagia/reflux/vomiting which she feels is another issue, worsening x 6+ months, despite taking BID Protonix 40mg.  Having more difficulty swallowing pills.       (+) tobacco use, trying to quit, has Chantix RX.  Denies ASA/NSAIDS/steroids.  No COVID hx.      Last bariatric labs 5/31/19 - WNL.  Takes no vitamins.    Repeat labs 6/28/22 - Hgb 9.6, iron/ferritin low, TSH abnL, LFTs normal.     Will schedule IV iron and advise patient to f/up with PCP re: thyroid.     Would you advise UGI prior to scheduling anything surgical?  Do you want a repeat GBUS?  Thanks!

## 2022-07-12 ENCOUNTER — LAB (OUTPATIENT)
Dept: LAB | Facility: HOSPITAL | Age: 47
End: 2022-07-12

## 2022-07-15 ENCOUNTER — HOSPITAL ENCOUNTER (OUTPATIENT)
Dept: GENERAL RADIOLOGY | Facility: HOSPITAL | Age: 47
Discharge: HOME | End: 2022-07-15
Admitting: PHYSICIAN ASSISTANT

## 2022-07-15 DIAGNOSIS — K21.9 GASTROESOPHAGEAL REFLUX DISEASE, UNSPECIFIED WHETHER ESOPHAGITIS PRESENT: ICD-10-CM

## 2022-07-15 DIAGNOSIS — R13.10 DYSPHAGIA, UNSPECIFIED TYPE: ICD-10-CM

## 2022-07-15 PROCEDURE — 74240 X-RAY XM UPR GI TRC 1CNTRST: CPT

## 2022-07-28 RX ORDER — SODIUM CHLORIDE 9 MG/ML
150 INJECTION, SOLUTION INTRAVENOUS CONTINUOUS
Status: CANCELLED | OUTPATIENT
Start: 2022-07-28

## 2022-07-28 RX ORDER — ENOXAPARIN SODIUM 150 MG/ML
40 INJECTION SUBCUTANEOUS ONCE
Status: CANCELLED | OUTPATIENT
Start: 2022-07-28 | End: 2022-07-28

## 2022-07-28 RX ORDER — SCOLOPAMINE TRANSDERMAL SYSTEM 1 MG/1
1 PATCH, EXTENDED RELEASE TRANSDERMAL ONCE
Status: CANCELLED | OUTPATIENT
Start: 2022-07-28 | End: 2022-07-28

## 2022-07-28 RX ORDER — ACETAMINOPHEN 500 MG
1000 TABLET ORAL ONCE
Status: CANCELLED | OUTPATIENT
Start: 2022-07-28 | End: 2022-07-28

## 2022-07-28 RX ORDER — SODIUM CHLORIDE 0.9 % (FLUSH) 0.9 %
3 SYRINGE (ML) INJECTION EVERY 12 HOURS SCHEDULED
Status: CANCELLED | OUTPATIENT
Start: 2022-07-28

## 2022-07-28 RX ORDER — SODIUM CHLORIDE 0.9 % (FLUSH) 0.9 %
3-10 SYRINGE (ML) INJECTION AS NEEDED
Status: CANCELLED | OUTPATIENT
Start: 2022-07-28

## 2025-06-26 ENCOUNTER — TRANSCRIBE ORDERS (OUTPATIENT)
Dept: ADMINISTRATIVE | Facility: HOSPITAL | Age: 50
End: 2025-06-26
Payer: COMMERCIAL

## 2025-06-26 ENCOUNTER — HOSPITAL ENCOUNTER (OUTPATIENT)
Facility: HOSPITAL | Age: 50
Discharge: HOME | End: 2025-06-26

## 2025-06-26 DIAGNOSIS — Z02.1 PRE-EMPLOYMENT HEALTH SCREENING EXAMINATION: Primary | ICD-10-CM

## 2025-06-26 DIAGNOSIS — Z02.1 PRE-EMPLOYMENT HEALTH SCREENING EXAMINATION: ICD-10-CM

## 2025-06-26 PROCEDURE — 71046 X-RAY EXAM CHEST 2 VIEWS: CPT

## 2025-06-26 PROCEDURE — 71046 X-RAY EXAM CHEST 2 VIEWS: CPT | Performed by: RADIOLOGY

## (undated) DEVICE — FLTR PLUMEPORT LAP W/CONN STRL

## (undated) DEVICE — SHT AIR TRANSFR COMFRT GLIDE LT LAT 40X80IN

## (undated) DEVICE — ANTIBACTERIAL VIOLET BRAIDED (POLYGLACTIN 910), SYNTHETIC ABSORBABLE SUTURE: Brand: COATED VICRYL

## (undated) DEVICE — APL DUPLOSPRAYER MIS 40CM

## (undated) DEVICE — APPL HEMOS FOR DELIVERY FLOSEAL

## (undated) DEVICE — CONTN GRAD MEAS TRIANG 32OZ BLK

## (undated) DEVICE — COVER,LIGHT HANDLE,FLX,1/PK: Brand: MEDLINE INDUSTRIES, INC.

## (undated) DEVICE — ENDOPATH XCEL BLADELESS TROCARS WITH STABILITY SLEEVES: Brand: ENDOPATH XCEL

## (undated) DEVICE — SKIN AFFIX SURG ADHESIVE 72/CS 0.55ML: Brand: MEDLINE

## (undated) DEVICE — [HIGH FLOW INSUFFLATOR,  DO NOT USE IF PACKAGE IS DAMAGED,  KEEP DRY,  KEEP AWAY FROM SUNLIGHT,  PROTECT FROM HEAT AND RADIOACTIVE SOURCES.]: Brand: PNEUMOSURE

## (undated) DEVICE — POWER SHELL: Brand: SIGNIA

## (undated) DEVICE — PK BARIATRIC 10

## (undated) DEVICE — SYS CLS PORTSITE CT CLOSESURE 5AND10/12

## (undated) DEVICE — TISSUE RETRIEVAL SYSTEM: Brand: INZII RETRIEVAL SYSTEM

## (undated) DEVICE — ENDOPATH XCEL UNIVERSAL TROCAR STABLILITY SLEEVES: Brand: ENDOPATH XCEL

## (undated) DEVICE — GLV SURG SENSICARE MICRO PF LF 9 STRL

## (undated) DEVICE — GLV SURG SENSICARE MICRO PF LF 8.5 STRL

## (undated) DEVICE — MARYLAND JAW LAPAROSCOPIC SEALER/DIVIDER COATED: Brand: LIGASURE

## (undated) DEVICE — TROCAR: Brand: KII FIOS FIRST ENTRY

## (undated) DEVICE — DRN PENRS 1/2X18IN LTX

## (undated) DEVICE — UNDRPD COMFRT GLD DRYPAD 36X57IN

## (undated) DEVICE — SUT SILK 0 SH 30IN K834H

## (undated) DEVICE — SUT MONOCRYL PLS ANTIB UND 3/0  PS1 27IN